# Patient Record
Sex: MALE | Race: WHITE | NOT HISPANIC OR LATINO
[De-identification: names, ages, dates, MRNs, and addresses within clinical notes are randomized per-mention and may not be internally consistent; named-entity substitution may affect disease eponyms.]

---

## 2017-01-03 ENCOUNTER — RESULT REVIEW (OUTPATIENT)
Age: 72
End: 2017-01-03

## 2017-02-27 ENCOUNTER — OUTPATIENT (OUTPATIENT)
Dept: OUTPATIENT SERVICES | Facility: HOSPITAL | Age: 72
LOS: 1 days | Discharge: HOME | End: 2017-02-27
Payer: MEDICARE

## 2017-02-27 ENCOUNTER — MED ADMIN CHARGE (OUTPATIENT)
Age: 72
End: 2017-02-27

## 2017-02-27 VITALS
TEMPERATURE: 98 F | HEART RATE: 78 BPM | DIASTOLIC BLOOD PRESSURE: 60 MMHG | OXYGEN SATURATION: 95 % | SYSTOLIC BLOOD PRESSURE: 110 MMHG | RESPIRATION RATE: 14 BRPM

## 2017-02-27 DIAGNOSIS — E78.5 HYPERLIPIDEMIA, UNSPECIFIED: ICD-10-CM

## 2017-02-27 DIAGNOSIS — Z95.1 PRESENCE OF AORTOCORONARY BYPASS GRAFT: ICD-10-CM

## 2017-02-27 DIAGNOSIS — I25.110 ATHEROSCLEROTIC HEART DISEASE OF NATIVE CORONARY ARTERY WITH UNSTABLE ANGINA PECTORIS: ICD-10-CM

## 2017-02-27 DIAGNOSIS — I50.9 HEART FAILURE, UNSPECIFIED: ICD-10-CM

## 2017-02-27 DIAGNOSIS — I25.2 OLD MYOCARDIAL INFARCTION: ICD-10-CM

## 2017-02-27 DIAGNOSIS — Z98.890 OTHER SPECIFIED POSTPROCEDURAL STATES: Chronic | ICD-10-CM

## 2017-02-27 DIAGNOSIS — R94.39 ABNORMAL RESULT OF OTHER CARDIOVASCULAR FUNCTION STUDY: ICD-10-CM

## 2017-02-27 DIAGNOSIS — I10 ESSENTIAL (PRIMARY) HYPERTENSION: ICD-10-CM

## 2017-02-27 DIAGNOSIS — Z95.810 PRESENCE OF AUTOMATIC (IMPLANTABLE) CARDIAC DEFIBRILLATOR: Chronic | ICD-10-CM

## 2017-02-27 LAB
APTT BLD: 39.9 SEC — HIGH (ref 27.5–37.4)
INR BLD: 2.03 — HIGH (ref 0.88–1.16)
PROTHROM AB SERPL-ACNC: 22.7 SEC — HIGH (ref 10–13.1)

## 2017-02-27 PROCEDURE — 33262 RMVL& REPLC PULSE GEN 1 LEAD: CPT

## 2017-02-27 PROCEDURE — 93641 EP EVL 1/2CHMB PAC CVDFB TST: CPT | Mod: 26

## 2017-02-27 PROCEDURE — 36415 COLL VENOUS BLD VENIPUNCTURE: CPT

## 2017-02-27 PROCEDURE — 93458 L HRT ARTERY/VENTRICLE ANGIO: CPT | Mod: XU

## 2017-02-27 PROCEDURE — 92928 PRQ TCAT PLMT NTRAC ST 1 LES: CPT | Mod: LC

## 2017-02-27 PROCEDURE — 85730 THROMBOPLASTIN TIME PARTIAL: CPT

## 2017-02-27 PROCEDURE — 85610 PROTHROMBIN TIME: CPT

## 2017-02-27 PROCEDURE — C1722: CPT

## 2017-02-27 RX ORDER — CEFAZOLIN SODIUM 1 G
1000 VIAL (EA) INJECTION ONCE
Qty: 0 | Refills: 0 | Status: DISCONTINUED | OUTPATIENT
Start: 2017-02-27 | End: 2017-02-27

## 2017-02-27 NOTE — H&P ADULT. - HISTORY OF PRESENT ILLNESS
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 He has been maintained on Sotalol for arrhythmia suppression. He had ATP for VT and one appropriate ICD shock in 2010. Echo and stress test were completed. His last ICD therapy noted was in 2012.  He feels well at baseline. Denies palpitations, CP, SOB, dizziness, or syncope. His ICD has reached CALEB and he is here for ICD generator change.  He is on Coumadin for h/o pAF.  Last dose of coumadin was 2 days ago. (Dr. Pichardo follows his INR).

## 2017-02-27 NOTE — PROGRESS NOTE ADULT - SUBJECTIVE AND OBJECTIVE BOX
JENNIFER ANDREWS  [id number]    Procedure:  CRYO PVI for AFib  Ablation of CTI for AFlutter        Indication:  Persistent atrial fibrillation        Electrophysiologist(s):  MD Audie John MD Mountantonakis, MD        Anesthesia:  MD Abdoul Bullard MD      Findings:  Successful ablation of pulmonary veins  Successful ablation of CTI with demonstration of bidirectional block       Complications:  Post procedure, blood was noted in the mouth, pre-extubation that was due to a laceration of the tongue.  At no time was the patient's blood pressure, oxygenation or respiratory status comprimised.  ENT is called and will see the patient while he is still intubated.        Recommendations:  ENT recommendation pending.  Anticoagulation to resume this evening.  Resume other medications.

## 2017-02-27 NOTE — H&P ADULT. - ASSESSMENT
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 He has been maintained on Sotalol for arrhythmia suppression. He had ATP for VT and one appropriate ICD shock in 2010. Echo and stress test were completed. His last ICD therapy noted was in 2012.  He feels well at baseline. Denies palpitations, CP, SOB, dizziness, or syncope. His ICD has reached CALEB and he is here for ICD generator change.  He is on Coumadin for h/o pAF.  Last dose of coumadin was 2 days ago. (Dr. Pichardo follows his INR).   - NPO for ICD gen change  - INR check today.   - To resume home meds.   - d/c home today  - f/u with Dr. John in 1 month

## 2017-02-27 NOTE — H&P ADULT. - PMH
DM (diabetes mellitus)    Dyslipidemia    HTN (hypertension)    MI, old    Paroxysmal atrial fibrillation    VT (ventricular tachycardia)

## 2017-04-11 ENCOUNTER — APPOINTMENT (OUTPATIENT)
Dept: HEART AND VASCULAR | Facility: CLINIC | Age: 72
End: 2017-04-11

## 2017-04-11 VITALS
BODY MASS INDEX: 30.1 KG/M2 | HEART RATE: 61 BPM | SYSTOLIC BLOOD PRESSURE: 113 MMHG | HEIGHT: 71 IN | WEIGHT: 215 LBS | DIASTOLIC BLOOD PRESSURE: 65 MMHG

## 2017-10-17 ENCOUNTER — APPOINTMENT (OUTPATIENT)
Dept: HEART AND VASCULAR | Facility: CLINIC | Age: 72
End: 2017-10-17
Payer: MEDICARE

## 2017-10-17 VITALS
DIASTOLIC BLOOD PRESSURE: 65 MMHG | HEIGHT: 71 IN | SYSTOLIC BLOOD PRESSURE: 117 MMHG | BODY MASS INDEX: 30.1 KG/M2 | HEART RATE: 62 BPM | WEIGHT: 215 LBS

## 2017-10-17 PROCEDURE — 99215 OFFICE O/P EST HI 40 MIN: CPT | Mod: 25

## 2017-10-17 PROCEDURE — 93282 PRGRMG EVAL IMPLANTABLE DFB: CPT

## 2017-10-17 RX ORDER — CEPHALEXIN 500 MG/1
500 CAPSULE ORAL
Qty: 10 | Refills: 0 | Status: DISCONTINUED | COMMUNITY
Start: 2017-02-27 | End: 2017-10-17

## 2018-02-06 ENCOUNTER — APPOINTMENT (OUTPATIENT)
Dept: HEART AND VASCULAR | Facility: CLINIC | Age: 73
End: 2018-02-06
Payer: MEDICARE

## 2018-02-06 VITALS
WEIGHT: 212 LBS | DIASTOLIC BLOOD PRESSURE: 63 MMHG | SYSTOLIC BLOOD PRESSURE: 106 MMHG | HEART RATE: 57 BPM | BODY MASS INDEX: 29.68 KG/M2 | HEIGHT: 71 IN

## 2018-02-06 PROCEDURE — 93282 PRGRMG EVAL IMPLANTABLE DFB: CPT

## 2018-08-07 ENCOUNTER — APPOINTMENT (OUTPATIENT)
Dept: HEART AND VASCULAR | Facility: CLINIC | Age: 73
End: 2018-08-07
Payer: MEDICARE

## 2018-08-07 VITALS
SYSTOLIC BLOOD PRESSURE: 107 MMHG | HEART RATE: 60 BPM | BODY MASS INDEX: 29.12 KG/M2 | DIASTOLIC BLOOD PRESSURE: 61 MMHG | HEIGHT: 71 IN | WEIGHT: 208 LBS

## 2018-08-07 PROBLEM — I47.2 VENTRICULAR TACHYCARDIA: Chronic | Status: ACTIVE | Noted: 2017-02-27

## 2018-08-07 PROBLEM — E78.5 HYPERLIPIDEMIA, UNSPECIFIED: Chronic | Status: ACTIVE | Noted: 2017-02-27

## 2018-08-07 PROBLEM — I10 ESSENTIAL (PRIMARY) HYPERTENSION: Chronic | Status: ACTIVE | Noted: 2017-02-27

## 2018-08-07 PROBLEM — E11.9 TYPE 2 DIABETES MELLITUS WITHOUT COMPLICATIONS: Chronic | Status: ACTIVE | Noted: 2017-02-27

## 2018-08-07 PROBLEM — I25.2 OLD MYOCARDIAL INFARCTION: Chronic | Status: ACTIVE | Noted: 2017-02-27

## 2018-08-07 PROBLEM — I48.0 PAROXYSMAL ATRIAL FIBRILLATION: Chronic | Status: ACTIVE | Noted: 2017-02-27

## 2018-08-07 PROCEDURE — 93282 PRGRMG EVAL IMPLANTABLE DFB: CPT | Mod: 59

## 2018-08-15 ENCOUNTER — APPOINTMENT (OUTPATIENT)
Dept: ENDOCRINOLOGY | Facility: CLINIC | Age: 73
End: 2018-08-15
Payer: MEDICARE

## 2018-08-15 VITALS
DIASTOLIC BLOOD PRESSURE: 70 MMHG | SYSTOLIC BLOOD PRESSURE: 106 MMHG | WEIGHT: 205 LBS | HEIGHT: 71 IN | HEART RATE: 61 BPM | BODY MASS INDEX: 28.7 KG/M2

## 2018-08-15 DIAGNOSIS — E11.29 TYPE 2 DIABETES MELLITUS WITH OTHER DIABETIC KIDNEY COMPLICATION: ICD-10-CM

## 2018-08-15 LAB
GLUCOSE BLDC GLUCOMTR-MCNC: 166
HBA1C MFR BLD HPLC: 7.6

## 2018-08-15 PROCEDURE — 82962 GLUCOSE BLOOD TEST: CPT

## 2018-08-15 PROCEDURE — 97802 MEDICAL NUTRITION INDIV IN: CPT

## 2018-08-15 PROCEDURE — 99204 OFFICE O/P NEW MOD 45 MIN: CPT | Mod: 25

## 2018-08-15 PROCEDURE — 83036 HEMOGLOBIN GLYCOSYLATED A1C: CPT | Mod: QW

## 2018-11-21 ENCOUNTER — APPOINTMENT (OUTPATIENT)
Dept: ENDOCRINOLOGY | Facility: CLINIC | Age: 73
End: 2018-11-21

## 2018-12-04 ENCOUNTER — APPOINTMENT (OUTPATIENT)
Dept: HEART AND VASCULAR | Facility: CLINIC | Age: 73
End: 2018-12-04
Payer: MEDICARE

## 2018-12-04 VITALS
BODY MASS INDEX: 27.58 KG/M2 | HEIGHT: 71 IN | DIASTOLIC BLOOD PRESSURE: 70 MMHG | HEART RATE: 65 BPM | WEIGHT: 197 LBS | SYSTOLIC BLOOD PRESSURE: 116 MMHG

## 2018-12-04 PROCEDURE — 99215 OFFICE O/P EST HI 40 MIN: CPT | Mod: 25

## 2018-12-04 PROCEDURE — 93282 PRGRMG EVAL IMPLANTABLE DFB: CPT

## 2018-12-05 NOTE — END OF VISIT
[] : Nurse Practitioner [>50% of Time Spent on Counseling and Coordination of Care for  ___] : Greater than 50% of the encounter time was spent on counseling and coordination of care for [unfilled] [Time Spent: ___ minutes] : I have spent [unfilled] minutes of face to face time with the patient

## 2018-12-05 NOTE — PHYSICAL EXAM
[General Appearance - Well Developed] : well developed [Normal Appearance] : normal appearance [Well Groomed] : well groomed [General Appearance - Well Nourished] : well nourished [No Deformities] : no deformities [General Appearance - In No Acute Distress] : no acute distress [Heart Rate And Rhythm] : heart rate and rhythm were normal [Heart Sounds] : normal S1 and S2 [Edema] : no peripheral edema present [] : no respiratory distress [Respiration, Rhythm And Depth] : normal respiratory rhythm and effort [Exaggerated Use Of Accessory Muscles For Inspiration] : no accessory muscle use [Auscultation Breath Sounds / Voice Sounds] : lungs were clear to auscultation bilaterally [Left Infraclavicular] : left infraclavicular area [Clean] : clean [Dry] : dry [Well-Healed] : well-healed [Palpable Crepitus] : no palpable crepitus [Bleeding] : no active bleeding [Foul Odor] : no foul smell [Purulent Drainage] : no purulent drainage [Serosanguineous Drainage] : no serosanquineous drainage [Serous Drainage] : no serous drainage [Erythema] : not erythematous [Warm] : not warm [Tender] : not tender [Indurated] : not indurated [Fluctuant] : not fluctuant [Abdomen Mass (___ Cm)] : no abdominal mass palpated [Cyanosis, Localized] : no localized cyanosis

## 2018-12-05 NOTE — HISTORY OF PRESENT ILLNESS
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  A single chamber ICD was implanted in 2000 with generator change in 2/2008 and 2/2017.  He has been maintained on Sotalol for arrhythmia suppression.  \par \par Throughout the years he has had VT terminated by ATP and had an appropriate ICD shock in 2010, with successful arrhythmia termination.  Echo and stress test were completed.  Sotalol was increased from 80 to 120mg TID.  \par \par Presents with c/o ICD shock 11/25/18.  Admits to some dietary noncompliance.  Also concerned because he has been receiving Sotalol from a different .  Tolerating Coumadin without bleeding issues.

## 2018-12-05 NOTE — PROCEDURE
[No] : not [ICD] : Implantable cardioverter-defibrillator [VVI] : VVI [Charge Time: ___ sec] : charge time was [unfilled] seconds [Longevity: ___ months] : The estimated remaining battery life is [unfilled] months [Normal] : The battery status is normal. [Threshold Testing Performed] : Threshold testing was performed [Lead Imp:  ___ohms] : lead impedance was [unfilled] ohms [Sensing Amplitude ___mv] : sensing amplitude was [unfilled] mv [___V @] : [unfilled] V [___ ms] : [unfilled] ms [None] : none [Sense ___ %] : Sense [unfilled]% [de-identified] : Medtronic [de-identified] : Etienne NOONAN VR [de-identified] : [id number] [de-identified] : 27 February 2017 [de-identified] : 42 [de-identified] : 9.8  years [de-identified] : VT event 11/25/18, CL ~ 300 ms  -  S/P ATP and 35 J shock with return to SR\par OptiVol threshold crossed mid-late November\par VS 99%\par  <0.1%\par shock impedance 52

## 2019-02-08 ENCOUNTER — APPOINTMENT (OUTPATIENT)
Dept: HEART AND VASCULAR | Facility: CLINIC | Age: 74
End: 2019-02-08
Payer: MEDICARE

## 2019-02-08 VITALS
WEIGHT: 198 LBS | DIASTOLIC BLOOD PRESSURE: 64 MMHG | HEART RATE: 63 BPM | BODY MASS INDEX: 27.72 KG/M2 | HEIGHT: 71 IN | SYSTOLIC BLOOD PRESSURE: 100 MMHG

## 2019-02-08 DIAGNOSIS — Z86.79 PERSONAL HISTORY OF OTHER DISEASES OF THE CIRCULATORY SYSTEM: ICD-10-CM

## 2019-02-08 PROCEDURE — 99215 OFFICE O/P EST HI 40 MIN: CPT | Mod: 25

## 2019-02-08 PROCEDURE — 93283 PRGRMG EVAL IMPLANTABLE DFB: CPT

## 2019-02-08 PROCEDURE — 93290 INTERROG DEV EVAL ICPMS IP: CPT | Mod: 26

## 2019-02-08 NOTE — PHYSICAL EXAM
[General Appearance - Well Developed] : well developed [Normal Appearance] : normal appearance [Well Groomed] : well groomed [General Appearance - Well Nourished] : well nourished [No Deformities] : no deformities [General Appearance - In No Acute Distress] : no acute distress [Heart Rate And Rhythm] : heart rate and rhythm were normal [Heart Sounds] : normal S1 and S2 [Edema] : no peripheral edema present [] : no respiratory distress [Respiration, Rhythm And Depth] : normal respiratory rhythm and effort [Exaggerated Use Of Accessory Muscles For Inspiration] : no accessory muscle use [Auscultation Breath Sounds / Voice Sounds] : lungs were clear to auscultation bilaterally [Left Infraclavicular] : left infraclavicular area [Clean] : clean [Dry] : dry [Well-Healed] : well-healed [Abdomen Mass (___ Cm)] : no abdominal mass palpated [Cyanosis, Localized] : no localized cyanosis [Palpable Crepitus] : no palpable crepitus [Bleeding] : no active bleeding [Foul Odor] : no foul smell [Purulent Drainage] : no purulent drainage [Serosanguineous Drainage] : no serosanquineous drainage [Serous Drainage] : no serous drainage [Erythema] : not erythematous [Warm] : not warm [Tender] : not tender [Indurated] : not indurated [Fluctuant] : not fluctuant

## 2019-02-08 NOTE — PROCEDURE
[No] : not [ICD] : Implantable cardioverter-defibrillator [VVI] : VVI [Charge Time: ___ sec] : charge time was [unfilled] seconds [Longevity: ___ months] : The estimated remaining battery life is [unfilled] months [Normal] : The battery status is normal. [Threshold Testing Performed] : Threshold testing was performed [Lead Imp:  ___ohms] : lead impedance was [unfilled] ohms [Sensing Amplitude ___mv] : sensing amplitude was [unfilled] mv [___V @] : [unfilled] V [___ ms] : [unfilled] ms [None] : none [Sense ___ %] : Sense [unfilled]% [NSR] : normal sinus rhythm [de-identified] : Medtronic [de-identified] : Etienne NOONAN VR [de-identified] : [id number] [de-identified] : 27 February 2017 [de-identified] : 42 [de-identified] : 9.8  years [de-identified] : VT event 1/24/19, CL ~ 300 ms  -  S/P ATP and 35 J shock with return to SR\par OptiVol threshold not crossed\par VS 99%\par  <0.1%\par shock impedance 48/66

## 2019-02-08 NOTE — HISTORY OF PRESENT ILLNESS
[None] : The patient complains of no symptoms [de-identified] : Mr. Layton is a 73 year old man with DM, AWMI, NSVT and inducible VT at EPS.  A single chamber ICD was implanted in 2000 with generator change in 2/2008 and 2/2017.  He has been maintained on Sotalol for arrhythmia suppression.  \par \par Throughout the years he has had VT terminated by ATP and had an appropriate ICD shock in 2010, with successful arrhythmia termination.  Echo and stress test were completed.  Sotalol was increased from 80 to 120mg TID.  \par \par Recently presented with c/o ICD shock 11/25/18.  Admits to some dietary noncompliance.  Also concerned because he has been receiving Sotalol from a different .  On 1/24 the patient received another shock corresponding with VT at 188 bpm. States that he was compliant with sotalol and feeling well. He notes that seconds prior to the shock he felt some dizziness, the shock occurred and he felt better. He presents today to discuss VT management. \par \par Tolerating Coumadin without bleeding issues.

## 2019-02-28 ENCOUNTER — FORM ENCOUNTER (OUTPATIENT)
Age: 74
End: 2019-02-28

## 2019-03-01 ENCOUNTER — APPOINTMENT (OUTPATIENT)
Dept: CT IMAGING | Facility: HOSPITAL | Age: 74
End: 2019-03-01
Payer: MEDICARE

## 2019-03-01 ENCOUNTER — OUTPATIENT (OUTPATIENT)
Dept: OUTPATIENT SERVICES | Facility: HOSPITAL | Age: 74
LOS: 1 days | End: 2019-03-01
Payer: MEDICARE

## 2019-03-01 DIAGNOSIS — Z98.890 OTHER SPECIFIED POSTPROCEDURAL STATES: Chronic | ICD-10-CM

## 2019-03-01 DIAGNOSIS — Z95.810 PRESENCE OF AUTOMATIC (IMPLANTABLE) CARDIAC DEFIBRILLATOR: Chronic | ICD-10-CM

## 2019-03-01 PROCEDURE — 75574 CT ANGIO HRT W/3D IMAGE: CPT | Mod: 26

## 2019-03-01 PROCEDURE — 75574 CT ANGIO HRT W/3D IMAGE: CPT

## 2019-03-01 PROCEDURE — C8929: CPT

## 2019-03-01 PROCEDURE — 93306 TTE W/DOPPLER COMPLETE: CPT | Mod: 26

## 2019-03-07 ENCOUNTER — FORM ENCOUNTER (OUTPATIENT)
Age: 74
End: 2019-03-07

## 2019-03-07 VITALS
DIASTOLIC BLOOD PRESSURE: 67 MMHG | SYSTOLIC BLOOD PRESSURE: 112 MMHG | OXYGEN SATURATION: 97 % | RESPIRATION RATE: 18 BRPM | HEART RATE: 54 BPM | TEMPERATURE: 97 F

## 2019-03-07 NOTE — H&P ADULT - ASSESSMENT
73 y o male with FMH of heart diseases with PMH of Afib (on Coumadin last dose on 3/5/19), s/p ICD placement in [facility] in 2017, HTN, HLD, DM, CAD (s/p stent placement in 1994 in [facility], pt was told to bring card) presented to his doctor Dora after ICD shocked him twice on November 25/18 and January 24/2019 and underwent an abnormal CTA and abnormal ECHO and is now referred to [facility] for recommended LHC with possible intervention.      Risks & benefits of procedure and alternative therapy have been explained to the patient including but not limited to: allergic reaction, bleeding w/possible need for blood transfusion, infection, renal and vascular compromise, limb damage, arrhythmia, stroke, vessel dissection/perforation, Myocardial infarction, emergent CABG. Informed consent obtained and in chart. 73 y o male with FMH of heart diseases with PMH of Afib (on Coumadin last dose on 3/5/19), s/p ICD placement in [facility] in 2017, HTN, HLD, DM, CAD (s/p stent placement in 1994 in [facility], pt was told to bring card) presented to his doctor Dora after ICD shocked him twice on November 25/18 and January 24/2019 and underwent an abnormal CTA and abnormal ECHO and is now referred to [facility] for recommended LHC with possible intervention.      INR 1.76, Dr. Padron (intervention fellow) made aware  Loaded with ASA 325mg and Plavix 600mg PO x1 prior to cath procedure    Risks & benefits of procedure and alternative therapy have been explained to the patient including but not limited to: allergic reaction, bleeding w/possible need for blood transfusion, infection, renal and vascular compromise, limb damage, arrhythmia, stroke, vessel dissection/perforation, Myocardial infarction, emergent CABG. Informed consent obtained and in chart.

## 2019-03-07 NOTE — H&P ADULT - HISTORY OF PRESENT ILLNESS
73 y o male with PMH of Afib (on Coumadin last dose on 3/1/19), s/p ICD placement in [facility] in 2017, HTN, HLD, DM, obesity, polymyalgia rheumatica, shingles, CAD (s/p multiple stents), s/p STEMI 4/27/17 (last cath in [facility] into LAD, midly reduced EF of 46% presented to his doctor c/o CP. Pt denies SOB, LE edema, PND/orthopnea, dizziness, palpitations, syncope, n/v, fever/chills, blood in the stool. CT angio of coronaries was done on 3/1/19 which indicated no ARGELIA, no LA thrombus, Calcium score of 3866 A units, severe stenosis of LAD/D1; dense calcified plaque in prox LCX/large ramus intermedius/prox RCA, severe stenosis cannot be excluded; motion artifact in OM1, distl RCA and RPL significant stenosis cannot be excluded; wall thinning with low attenuation in the basal to apical anterior walls, basal to mid anteroseptum, consistent with MI; LVEF severely depressed with akinesis of the basal to apical anterior walls and basal to mid anteroseptum, LV dilated, LA moderately dilated. ECHO done on 3/1/19 indicated severe dilated LV, severely reduced LV systolic function with EF of 25%, regional WMA, LV diastolic dysfucntion, gr II, no LV thrombi, LA mildly dilated, trace MR/TR/HI. In light of pt's risk factors, symptoms mentioned above, prior history of CAD, abnormal CTA and newly depressed EF, pt is now referred to [facility] for recommended LHC with possible intervention. PT WILL BRING MEDS WITH HIM PLEASE REVIEW    73 y o male with FMH of heart diseases with PMH of Afib (on Coumadin last dose on 3/5/19), s/p ICD placement in [facility] in 2017, HTN, HLD, DM, CAD (s/p stent placement in 1994 in [facility], pt was told to bring card) presented to his doctor Dora after ICD shocked him twice on November 25/18 and January 24/2019. Pt is on Sotalol for VT suppression. Pt was scheduled to go for VT ablation on Monday March 11, but prior to his ablation will undergo LHC due to abnormal CTA. Other than ICD being shocked twice pt denies CP, SOB, LE edema, PND/orthopnea, dizziness, palpitations, syncope, n/v, fever/chills, blood in the stool. CT angio of coronaries was done on 3/1/19 which indicated no ARGELIA, no LA thrombus, Calcium score of 3866 A units, severe stenosis of LAD/D1; dense calcified plaque in prox LCX/large ramus intermedius/prox RCA, severe stenosis cannot be excluded; motion artifact in OM1, distl RCA and RPL significant stenosis cannot be excluded; wall thinning with low attenuation in the basal to apical anterior walls, basal to mid anteroseptum, consistent with MI; LVEF severely depressed with akinesis of the basal to apical anterior walls and basal to mid anteroseptum, LV dilated, LA moderately dilated. ECHO done on 3/1/19 indicated severe dilated LV, severely reduced LV systolic function with EF of 25%, regional WMA, LV diastolic dysfucntion, gr II, no LV thrombi, LA mildly dilated, trace MR/TR/TN. In light of pt's risk factors, symptoms mentioned above, prior history of CAD, abnormal CTA and abnormal ECHO, pt is now referred to [facility] for recommended LHC with possible intervention. 73 y o male with FMH of heart diseases with PMH of Afib (on Coumadin last dose on 3/5/19), s/p ICD placement in [facility] in 2017, HTN, HLD, DM, CAD (s/p stent placement in 1994 in [facility], pt was told to bring card) presented to his doctor Dora after ICD shocked him twice on November 25/18 and January 24/2019. Pt is on Sotalol for VT suppression. Pt was scheduled to go for VT ablation on Monday March 11, but prior to his ablation will undergo LHC due to abnormal CTA. Other than ICD being shocked twice pt denies CP, SOB, LE edema, PND/orthopnea, dizziness, palpitations, syncope, n/v, fever/chills, blood in the stool. CT angio of coronaries was done on 3/1/19 which indicated no ARGELIA, no LA thrombus, Calcium score of 3866 A units, severe stenosis of LAD/D1; dense calcified plaque in prox LCX/large ramus intermedius/prox RCA, severe stenosis cannot be excluded; motion artifact in OM1, distl RCA and RPL significant stenosis cannot be excluded; wall thinning with low attenuation in the basal to apical anterior walls, basal to mid anteroseptum, consistent with MI; LVEF severely depressed with akinesis of the basal to apical anterior walls and basal to mid anteroseptum, LV dilated, LA moderately dilated. ECHO done on 3/1/19 indicated severe dilated LV, severely reduced LV systolic function with EF of 25%, regional WMA, LV diastolic dysfucntion, gr II, no LV thrombi, LA mildly dilated, trace MR/TR/MI. In light of pt's risk factors, symptoms mentioned above, prior history of CAD, abnormal CTA and abnormal ECHO, pt is now referred to [facility] for recommended LHC with possible intervention.

## 2019-03-08 ENCOUNTER — INPATIENT (INPATIENT)
Facility: HOSPITAL | Age: 74
LOS: 4 days | Discharge: HOME | DRG: 286 | End: 2019-03-13
Attending: PSYCHIATRY & NEUROLOGY | Admitting: PSYCHIATRY & NEUROLOGY
Payer: MEDICARE

## 2019-03-08 DIAGNOSIS — G97.82 OTHER POSTPROCEDURAL COMPLICATIONS AND DISORDERS OF NERVOUS SYSTEM: ICD-10-CM

## 2019-03-08 DIAGNOSIS — Z98.890 OTHER SPECIFIED POSTPROCEDURAL STATES: Chronic | ICD-10-CM

## 2019-03-08 DIAGNOSIS — Z95.810 PRESENCE OF AUTOMATIC (IMPLANTABLE) CARDIAC DEFIBRILLATOR: Chronic | ICD-10-CM

## 2019-03-08 LAB
ALBUMIN SERPL ELPH-MCNC: 4.1 G/DL — SIGNIFICANT CHANGE UP (ref 3.3–5)
ALBUMIN SERPL ELPH-MCNC: 4.2 G/DL — SIGNIFICANT CHANGE UP (ref 3.3–5)
ALP SERPL-CCNC: 57 U/L — SIGNIFICANT CHANGE UP (ref 40–120)
ALP SERPL-CCNC: 57 U/L — SIGNIFICANT CHANGE UP (ref 40–120)
ALT FLD-CCNC: 16 U/L — SIGNIFICANT CHANGE UP (ref 10–45)
ALT FLD-CCNC: 18 U/L — SIGNIFICANT CHANGE UP (ref 10–45)
ANION GAP SERPL CALC-SCNC: 10 MMOL/L — SIGNIFICANT CHANGE UP (ref 5–17)
ANION GAP SERPL CALC-SCNC: 10 MMOL/L — SIGNIFICANT CHANGE UP (ref 5–17)
APTT BLD: 31.4 SEC — SIGNIFICANT CHANGE UP (ref 27.5–36.3)
APTT BLD: >200 SEC — CRITICAL HIGH (ref 27.5–36.3)
AST SERPL-CCNC: 18 U/L — SIGNIFICANT CHANGE UP (ref 10–40)
AST SERPL-CCNC: 19 U/L — SIGNIFICANT CHANGE UP (ref 10–40)
BASOPHILS # BLD AUTO: 0.04 K/UL — SIGNIFICANT CHANGE UP (ref 0–0.2)
BASOPHILS NFR BLD AUTO: 0.6 % — SIGNIFICANT CHANGE UP (ref 0–2)
BILIRUB SERPL-MCNC: 0.7 MG/DL — SIGNIFICANT CHANGE UP (ref 0.2–1.2)
BILIRUB SERPL-MCNC: 1.1 MG/DL — SIGNIFICANT CHANGE UP (ref 0.2–1.2)
BUN SERPL-MCNC: 10 MG/DL — SIGNIFICANT CHANGE UP (ref 7–23)
BUN SERPL-MCNC: 13 MG/DL — SIGNIFICANT CHANGE UP (ref 7–23)
CALCIUM SERPL-MCNC: 8.8 MG/DL — SIGNIFICANT CHANGE UP (ref 8.4–10.5)
CALCIUM SERPL-MCNC: 9.2 MG/DL — SIGNIFICANT CHANGE UP (ref 8.4–10.5)
CHLORIDE SERPL-SCNC: 102 MMOL/L — SIGNIFICANT CHANGE UP (ref 96–108)
CHLORIDE SERPL-SCNC: 103 MMOL/L — SIGNIFICANT CHANGE UP (ref 96–108)
CHOLEST SERPL-MCNC: 96 MG/DL — SIGNIFICANT CHANGE UP (ref 10–199)
CO2 SERPL-SCNC: 25 MMOL/L — SIGNIFICANT CHANGE UP (ref 22–31)
CO2 SERPL-SCNC: 25 MMOL/L — SIGNIFICANT CHANGE UP (ref 22–31)
CREAT SERPL-MCNC: 0.66 MG/DL — SIGNIFICANT CHANGE UP (ref 0.5–1.3)
CREAT SERPL-MCNC: 0.69 MG/DL — SIGNIFICANT CHANGE UP (ref 0.5–1.3)
EOSINOPHIL # BLD AUTO: 0.2 K/UL — SIGNIFICANT CHANGE UP (ref 0–0.5)
EOSINOPHIL NFR BLD AUTO: 3 % — SIGNIFICANT CHANGE UP (ref 0–6)
GLUCOSE BLDC GLUCOMTR-MCNC: 112 MG/DL — HIGH (ref 70–99)
GLUCOSE BLDC GLUCOMTR-MCNC: 145 MG/DL — HIGH (ref 70–99)
GLUCOSE SERPL-MCNC: 116 MG/DL — HIGH (ref 70–99)
GLUCOSE SERPL-MCNC: 160 MG/DL — HIGH (ref 70–99)
HBA1C BLD-MCNC: 6.8 % — HIGH (ref 4–5.6)
HCT VFR BLD CALC: 43.2 % — SIGNIFICANT CHANGE UP (ref 39–50)
HCT VFR BLD CALC: 44.4 % — SIGNIFICANT CHANGE UP (ref 39–50)
HDLC SERPL-MCNC: 34 MG/DL — LOW
HGB BLD-MCNC: 14.6 G/DL — SIGNIFICANT CHANGE UP (ref 13–17)
HGB BLD-MCNC: 14.7 G/DL — SIGNIFICANT CHANGE UP (ref 13–17)
IMM GRANULOCYTES NFR BLD AUTO: 0.1 % — SIGNIFICANT CHANGE UP (ref 0–1.5)
INR BLD: 1.76 — HIGH (ref 0.88–1.16)
INR BLD: 2.02 — HIGH (ref 0.88–1.16)
LIPID PNL WITH DIRECT LDL SERPL: 44 MG/DL — SIGNIFICANT CHANGE UP
LYMPHOCYTES # BLD AUTO: 2.19 K/UL — SIGNIFICANT CHANGE UP (ref 1–3.3)
LYMPHOCYTES # BLD AUTO: 32.7 % — SIGNIFICANT CHANGE UP (ref 13–44)
MCHC RBC-ENTMCNC: 32.5 PG — SIGNIFICANT CHANGE UP (ref 27–34)
MCHC RBC-ENTMCNC: 33 PG — SIGNIFICANT CHANGE UP (ref 27–34)
MCHC RBC-ENTMCNC: 33.1 GM/DL — SIGNIFICANT CHANGE UP (ref 32–36)
MCHC RBC-ENTMCNC: 33.8 GM/DL — SIGNIFICANT CHANGE UP (ref 32–36)
MCV RBC AUTO: 97.5 FL — SIGNIFICANT CHANGE UP (ref 80–100)
MCV RBC AUTO: 98 FL — SIGNIFICANT CHANGE UP (ref 80–100)
MONOCYTES # BLD AUTO: 0.61 K/UL — SIGNIFICANT CHANGE UP (ref 0–0.9)
MONOCYTES NFR BLD AUTO: 9.1 % — SIGNIFICANT CHANGE UP (ref 2–14)
NEUTROPHILS # BLD AUTO: 3.65 K/UL — SIGNIFICANT CHANGE UP (ref 1.8–7.4)
NEUTROPHILS NFR BLD AUTO: 54.5 % — SIGNIFICANT CHANGE UP (ref 43–77)
NRBC # BLD: 0 /100 WBCS — SIGNIFICANT CHANGE UP (ref 0–0)
NRBC # BLD: 0 /100 WBCS — SIGNIFICANT CHANGE UP (ref 0–0)
PLATELET # BLD AUTO: 125 K/UL — LOW (ref 150–400)
PLATELET # BLD AUTO: 133 K/UL — LOW (ref 150–400)
POTASSIUM SERPL-MCNC: 4.2 MMOL/L — SIGNIFICANT CHANGE UP (ref 3.5–5.3)
POTASSIUM SERPL-MCNC: 4.3 MMOL/L — SIGNIFICANT CHANGE UP (ref 3.5–5.3)
POTASSIUM SERPL-SCNC: 4.2 MMOL/L — SIGNIFICANT CHANGE UP (ref 3.5–5.3)
POTASSIUM SERPL-SCNC: 4.3 MMOL/L — SIGNIFICANT CHANGE UP (ref 3.5–5.3)
PROT SERPL-MCNC: 7.1 G/DL — SIGNIFICANT CHANGE UP (ref 6–8.3)
PROT SERPL-MCNC: 7.1 G/DL — SIGNIFICANT CHANGE UP (ref 6–8.3)
PROTHROM AB SERPL-ACNC: 20.2 SEC — HIGH (ref 10–12.9)
PROTHROM AB SERPL-ACNC: 23.3 SEC — HIGH (ref 10–12.9)
RBC # BLD: 4.43 M/UL — SIGNIFICANT CHANGE UP (ref 4.2–5.8)
RBC # BLD: 4.53 M/UL — SIGNIFICANT CHANGE UP (ref 4.2–5.8)
RBC # FLD: 12.6 % — SIGNIFICANT CHANGE UP (ref 10.3–14.5)
RBC # FLD: 12.8 % — SIGNIFICANT CHANGE UP (ref 10.3–14.5)
SODIUM SERPL-SCNC: 137 MMOL/L — SIGNIFICANT CHANGE UP (ref 135–145)
SODIUM SERPL-SCNC: 138 MMOL/L — SIGNIFICANT CHANGE UP (ref 135–145)
TOTAL CHOLESTEROL/HDL RATIO MEASUREMENT: 2.8 RATIO — LOW (ref 3.4–9.6)
TRIGL SERPL-MCNC: 92 MG/DL — SIGNIFICANT CHANGE UP (ref 10–149)
WBC # BLD: 6.68 K/UL — SIGNIFICANT CHANGE UP (ref 3.8–10.5)
WBC # BLD: 6.7 K/UL — SIGNIFICANT CHANGE UP (ref 3.8–10.5)
WBC # FLD AUTO: 6.68 K/UL — SIGNIFICANT CHANGE UP (ref 3.8–10.5)
WBC # FLD AUTO: 6.7 K/UL — SIGNIFICANT CHANGE UP (ref 3.8–10.5)

## 2019-03-08 PROCEDURE — 70496 CT ANGIOGRAPHY HEAD: CPT | Mod: 26

## 2019-03-08 PROCEDURE — 93571 IV DOP VEL&/PRESS C FLO 1ST: CPT | Mod: 26

## 2019-03-08 PROCEDURE — 70450 CT HEAD/BRAIN W/O DYE: CPT | Mod: 26,59

## 2019-03-08 PROCEDURE — 93010 ELECTROCARDIOGRAM REPORT: CPT

## 2019-03-08 PROCEDURE — 70498 CT ANGIOGRAPHY NECK: CPT | Mod: 26

## 2019-03-08 PROCEDURE — 99223 1ST HOSP IP/OBS HIGH 75: CPT

## 2019-03-08 PROCEDURE — 93454 CORONARY ARTERY ANGIO S&I: CPT | Mod: 26

## 2019-03-08 RX ORDER — SODIUM CHLORIDE 9 MG/ML
250 INJECTION INTRAMUSCULAR; INTRAVENOUS; SUBCUTANEOUS ONCE
Qty: 0 | Refills: 0 | Status: COMPLETED | OUTPATIENT
Start: 2019-03-08 | End: 2019-03-08

## 2019-03-08 RX ORDER — DEXTROSE 50 % IN WATER 50 %
12.5 SYRINGE (ML) INTRAVENOUS ONCE
Qty: 0 | Refills: 0 | Status: DISCONTINUED | OUTPATIENT
Start: 2019-03-08 | End: 2019-03-13

## 2019-03-08 RX ORDER — EMTRICITABINE AND TENOFOVIR DISOPROXIL FUMARATE 200; 300 MG/1; MG/1
1 TABLET, FILM COATED ORAL DAILY
Qty: 0 | Refills: 0 | Status: DISCONTINUED | OUTPATIENT
Start: 2019-03-08 | End: 2019-03-11

## 2019-03-08 RX ORDER — SOTALOL HCL 120 MG
120 TABLET ORAL EVERY 12 HOURS
Qty: 0 | Refills: 0 | Status: DISCONTINUED | OUTPATIENT
Start: 2019-03-08 | End: 2019-03-09

## 2019-03-08 RX ORDER — NALOXONE HYDROCHLORIDE 4 MG/.1ML
0.8 SPRAY NASAL ONCE
Qty: 0 | Refills: 0 | Status: COMPLETED | OUTPATIENT
Start: 2019-03-08 | End: 2019-03-08

## 2019-03-08 RX ORDER — ATORVASTATIN CALCIUM 80 MG/1
40 TABLET, FILM COATED ORAL AT BEDTIME
Qty: 0 | Refills: 0 | Status: DISCONTINUED | OUTPATIENT
Start: 2019-03-08 | End: 2019-03-13

## 2019-03-08 RX ORDER — DEXTROSE 50 % IN WATER 50 %
15 SYRINGE (ML) INTRAVENOUS ONCE
Qty: 0 | Refills: 0 | Status: DISCONTINUED | OUTPATIENT
Start: 2019-03-08 | End: 2019-03-13

## 2019-03-08 RX ORDER — CHLORHEXIDINE GLUCONATE 213 G/1000ML
1 SOLUTION TOPICAL ONCE
Qty: 0 | Refills: 0 | Status: DISCONTINUED | OUTPATIENT
Start: 2019-03-08 | End: 2019-03-08

## 2019-03-08 RX ORDER — SODIUM CHLORIDE 9 MG/ML
1000 INJECTION, SOLUTION INTRAVENOUS
Qty: 0 | Refills: 0 | Status: DISCONTINUED | OUTPATIENT
Start: 2019-03-08 | End: 2019-03-13

## 2019-03-08 RX ORDER — REPAGLINIDE 1 MG/1
1 TABLET ORAL
Qty: 0 | Refills: 0 | COMMUNITY

## 2019-03-08 RX ORDER — EZETIMIBE 10 MG/1
1 TABLET ORAL
Qty: 0 | Refills: 0 | COMMUNITY

## 2019-03-08 RX ORDER — NOREPINEPHRINE BITARTRATE/D5W 8 MG/250ML
0.05 PLASTIC BAG, INJECTION (ML) INTRAVENOUS
Qty: 8 | Refills: 0 | Status: DISCONTINUED | OUTPATIENT
Start: 2019-03-08 | End: 2019-03-09

## 2019-03-08 RX ORDER — EMTRICITABINE AND TENOFOVIR DISOPROXIL FUMARATE 200; 300 MG/1; MG/1
1 TABLET, FILM COATED ORAL
Qty: 0 | Refills: 0 | COMMUNITY

## 2019-03-08 RX ORDER — NOREPINEPHRINE BITARTRATE/D5W 8 MG/250ML
0.05 PLASTIC BAG, INJECTION (ML) INTRAVENOUS
Qty: 8 | Refills: 0 | Status: DISCONTINUED | OUTPATIENT
Start: 2019-03-08 | End: 2019-03-08

## 2019-03-08 RX ORDER — DEXTROSE 50 % IN WATER 50 %
25 SYRINGE (ML) INTRAVENOUS ONCE
Qty: 0 | Refills: 0 | Status: DISCONTINUED | OUTPATIENT
Start: 2019-03-08 | End: 2019-03-13

## 2019-03-08 RX ORDER — PHENYLEPHRINE HYDROCHLORIDE 10 MG/ML
200 INJECTION INTRAVENOUS ONCE
Qty: 0 | Refills: 0 | Status: DISCONTINUED | OUTPATIENT
Start: 2019-03-08 | End: 2019-03-08

## 2019-03-08 RX ORDER — PHENYLEPHRINE HYDROCHLORIDE 10 MG/ML
0.1 INJECTION INTRAVENOUS
Qty: 40 | Refills: 0 | Status: DISCONTINUED | OUTPATIENT
Start: 2019-03-08 | End: 2019-03-08

## 2019-03-08 RX ORDER — CLOPIDOGREL BISULFATE 75 MG/1
600 TABLET, FILM COATED ORAL ONCE
Qty: 0 | Refills: 0 | Status: COMPLETED | OUTPATIENT
Start: 2019-03-08 | End: 2019-03-08

## 2019-03-08 RX ORDER — INSULIN LISPRO 100/ML
VIAL (ML) SUBCUTANEOUS
Qty: 0 | Refills: 0 | Status: DISCONTINUED | OUTPATIENT
Start: 2019-03-08 | End: 2019-03-13

## 2019-03-08 RX ORDER — ASPIRIN/CALCIUM CARB/MAGNESIUM 324 MG
81 TABLET ORAL DAILY
Qty: 0 | Refills: 0 | Status: DISCONTINUED | OUTPATIENT
Start: 2019-03-08 | End: 2019-03-13

## 2019-03-08 RX ORDER — GLUCAGON INJECTION, SOLUTION 0.5 MG/.1ML
1 INJECTION, SOLUTION SUBCUTANEOUS ONCE
Qty: 0 | Refills: 0 | Status: DISCONTINUED | OUTPATIENT
Start: 2019-03-08 | End: 2019-03-13

## 2019-03-08 RX ORDER — SODIUM CHLORIDE 9 MG/ML
500 INJECTION INTRAMUSCULAR; INTRAVENOUS; SUBCUTANEOUS
Qty: 0 | Refills: 0 | Status: DISCONTINUED | OUTPATIENT
Start: 2019-03-08 | End: 2019-03-08

## 2019-03-08 RX ORDER — METFORMIN HYDROCHLORIDE 850 MG/1
1 TABLET ORAL
Qty: 0 | Refills: 0 | COMMUNITY

## 2019-03-08 RX ORDER — FENOFIBRATE,MICRONIZED 130 MG
1 CAPSULE ORAL
Qty: 0 | Refills: 0 | COMMUNITY

## 2019-03-08 RX ORDER — SOTALOL HCL 120 MG
1 TABLET ORAL
Qty: 0 | Refills: 0 | COMMUNITY

## 2019-03-08 RX ORDER — ASPIRIN/CALCIUM CARB/MAGNESIUM 324 MG
325 TABLET ORAL ONCE
Qty: 0 | Refills: 0 | Status: COMPLETED | OUTPATIENT
Start: 2019-03-08 | End: 2019-03-08

## 2019-03-08 RX ORDER — SACUBITRIL AND VALSARTAN 24; 26 MG/1; MG/1
1 TABLET, FILM COATED ORAL
Qty: 0 | Refills: 0 | COMMUNITY

## 2019-03-08 RX ADMIN — Medication 8.34 MICROGRAM(S)/KG/MIN: at 19:22

## 2019-03-08 RX ADMIN — SODIUM CHLORIDE 50 MILLILITER(S): 9 INJECTION INTRAMUSCULAR; INTRAVENOUS; SUBCUTANEOUS at 12:55

## 2019-03-08 RX ADMIN — NALOXONE HYDROCHLORIDE 0.8 MILLIGRAM(S): 4 SPRAY NASAL at 16:56

## 2019-03-08 RX ADMIN — Medication 325 MILLIGRAM(S): at 12:44

## 2019-03-08 RX ADMIN — PHENYLEPHRINE HYDROCHLORIDE 3.34 MICROGRAM(S)/KG/MIN: 10 INJECTION INTRAVENOUS at 17:29

## 2019-03-08 RX ADMIN — SODIUM CHLORIDE 500 MILLILITER(S): 9 INJECTION INTRAMUSCULAR; INTRAVENOUS; SUBCUTANEOUS at 16:58

## 2019-03-08 RX ADMIN — CLOPIDOGREL BISULFATE 600 MILLIGRAM(S): 75 TABLET, FILM COATED ORAL at 12:43

## 2019-03-08 RX ADMIN — ATORVASTATIN CALCIUM 40 MILLIGRAM(S): 80 TABLET, FILM COATED ORAL at 22:00

## 2019-03-08 NOTE — PROGRESS NOTE ADULT - SUBJECTIVE AND OBJECTIVE BOX
***********************  MICU Acceptance Note  ***********************  Hospital Course:    SUBJECTIVE: Patient seen and examined at bedside.    OBJECTIVE:    VITAL SIGNS:  ICU Vital Signs Last 24 Hrs  T(C): 35.7 (08 Mar 2019 19:00), Max: 35.7 (08 Mar 2019 18:40)  T(F): 96.2 (08 Mar 2019 19:00), Max: 96.2 (08 Mar 2019 18:40)  HR: 54 (08 Mar 2019 19:00) (48 - 54)  BP: 132/67 (08 Mar 2019 19:00) (132/67 - 135/71)  BP(mean): 83 (08 Mar 2019 19:00) (83 - 90)  ABP: --  ABP(mean): --  RR: 13 (08 Mar 2019 19:00) (13 - 26)  SpO2: 98% (08 Mar 2019 19:00) (98% - 99%)        CAPILLARY BLOOD GLUCOSE  106 (08 Mar 2019 18:00)      POCT Blood Glucose.: 112 mg/dL (08 Mar 2019 18:43)      PHYSICAL EXAM:    General: WDWN ; NAD  HEENT: NC/AT; PERRL, anicteric sclera  Neck: supple, no JVD  Respiratory: CTA B/L; no W/R/R  Cardiovascular: +S1/S2; RRR; no M/R/G  Gastrointestinal: soft, NT/ND; +BS x4  Extremities: WWP; 2+ peripheral pulses B/L; no LE edema  Skin: normal color and turgor; no rash  Neurological:     MEDICATIONS:  MEDICATIONS  (STANDING):  aspirin  chewable 81 milliGRAM(s) Oral daily  atorvastatin 40 milliGRAM(s) Oral at bedtime  dextrose 5%. 1000 milliLiter(s) (50 mL/Hr) IV Continuous <Continuous>  dextrose 50% Injectable 12.5 Gram(s) IV Push Once  dextrose 50% Injectable 25 Gram(s) IV Push Once  dextrose 50% Injectable 25 Gram(s) IV Push Once  emtricitabine 200 mG/tenofovir 300 mG (TRUVADA) 1 Tablet(s) Oral daily  insulin lispro (HumaLOG) corrective regimen sliding scale   SubCutaneous Before meals and at bedtime  norepinephrine Infusion 0.05 MICROgram(s)/kG/Min (8.344 mL/Hr) IV Continuous <Continuous>    MEDICATIONS  (PRN):  dextrose 40% Gel 15 Gram(s) Oral Once PRN Blood Glucose LESS THAN 70 milliGRAM(s)/deciliter  glucagon  Injectable 1 milliGRAM(s) IntraMuscular Once PRN Glucose LESS THAN 70 milligrams/deciliter      ALLERGIES:  Allergies    No Known Allergies    Intolerances        LABS:                        14.7   6.68  )-----------( 133      ( 08 Mar 2019 17:49 )             44.4     03-08    138  |  103  |  10  ----------------------------<  116<H>  4.2   |  25  |  0.66    Ca    8.8      08 Mar 2019 17:49    TPro  7.1  /  Alb  4.1  /  TBili  1.1  /  DBili  x   /  AST  19  /  ALT  16  /  AlkPhos  57  03-08    LIVER FUNCTIONS - ( 08 Mar 2019 17:49 )  Alb: 4.1 g/dL / Pro: 7.1 g/dL / ALK PHOS: 57 U/L / ALT: 16 U/L / AST: 19 U/L / GGT: x           PT/INR - ( 08 Mar 2019 16:29 )   PT: 23.3 sec;   INR: 2.02          PTT - ( 08 Mar 2019 16:29 )  PTT:>200.0 sec    CARDIAC MARKERS ( 08 Mar 2019 10:17 )  x     / x     / 94 U/L / x     / 1.6 ng/mL        RADIOLOGY & ADDITIONAL TESTS: Reviewed. ***********************  MICU Acceptance Note  ***********************  Hospital Course:  73 M with AFib on Coumadin (last dose 3/5), HFrEF (EF 25%) s/p ICD placement, HTN, HLD, CAD s/p PCI and 3 stents 3 yrs ago was taken to an outpatient elective cath with  today (diagnostic) prior to his VT ablation next week with  for ICD firing x2 in the past few months for sustained VT. Today post cath (he was given 50 Fentanyl and Versed for sedation) he was confused, aphasic dysarthric with right sided hemiplegia, stroke code was called (last known normal being 3.30PM), CT head, CTA both negative, however per neurology stroke attending hypoperfusion in M2 and M3 area noted. At the time his SBP was 113-123, tPA not given because INR>1.5. Patient was then given Narcan due to pinpoint pupils and started on phenylephrine to keep MAP>90 due to hypoperfused stroke. Since then patient's right sided weakness has completely resolved, however still some expressive aphasia present.    SUBJECTIVE: Patient seen and examined at bedside. No complaints, denies fever nausea vomiting, chest pain, palpitations. Endorses improvement in symptoms.    OBJECTIVE:    VITAL SIGNS:  ICU Vital Signs Last 24 Hrs  T(C): 35.7 (08 Mar 2019 19:00), Max: 35.7 (08 Mar 2019 18:40)  T(F): 96.2 (08 Mar 2019 19:00), Max: 96.2 (08 Mar 2019 18:40)  HR: 54 (08 Mar 2019 19:00) (48 - 54)  BP: 132/67 (08 Mar 2019 19:00) (132/67 - 135/71)  BP(mean): 83 (08 Mar 2019 19:00) (83 - 90)  ABP: --  ABP(mean): --  RR: 13 (08 Mar 2019 19:00) (13 - 26)  SpO2: 98% (08 Mar 2019 19:00) (98% - 99%)        CAPILLARY BLOOD GLUCOSE  106 (08 Mar 2019 18:00)      POCT Blood Glucose.: 112 mg/dL (08 Mar 2019 18:43)      PHYSICAL EXAM:    General: WDWN ; NAD  HEENT: NC/AT; PERRL, anicteric sclera  Neck: supple, no JVD  Respiratory: CTA B/L; no W/R/R  Cardiovascular: +S1/S2; RRR; no M/R/G  Gastrointestinal: soft, NT/ND; +BS x4  Extremities: WWP; 2+ peripheral pulses B/L; no LE edema  Skin: normal color and turgor; no rash  Neurological:     MEDICATIONS:  MEDICATIONS  (STANDING):  aspirin  chewable 81 milliGRAM(s) Oral daily  atorvastatin 40 milliGRAM(s) Oral at bedtime  dextrose 5%. 1000 milliLiter(s) (50 mL/Hr) IV Continuous <Continuous>  dextrose 50% Injectable 12.5 Gram(s) IV Push Once  dextrose 50% Injectable 25 Gram(s) IV Push Once  dextrose 50% Injectable 25 Gram(s) IV Push Once  emtricitabine 200 mG/tenofovir 300 mG (TRUVADA) 1 Tablet(s) Oral daily  insulin lispro (HumaLOG) corrective regimen sliding scale   SubCutaneous Before meals and at bedtime  norepinephrine Infusion 0.05 MICROgram(s)/kG/Min (8.344 mL/Hr) IV Continuous <Continuous>    MEDICATIONS  (PRN):  dextrose 40% Gel 15 Gram(s) Oral Once PRN Blood Glucose LESS THAN 70 milliGRAM(s)/deciliter  glucagon  Injectable 1 milliGRAM(s) IntraMuscular Once PRN Glucose LESS THAN 70 milligrams/deciliter      ALLERGIES:  Allergies    No Known Allergies    Intolerances        LABS:                        14.7   6.68  )-----------( 133      ( 08 Mar 2019 17:49 )             44.4     03-08    138  |  103  |  10  ----------------------------<  116<H>  4.2   |  25  |  0.66    Ca    8.8      08 Mar 2019 17:49    TPro  7.1  /  Alb  4.1  /  TBili  1.1  /  DBili  x   /  AST  19  /  ALT  16  /  AlkPhos  57  03-08    LIVER FUNCTIONS - ( 08 Mar 2019 17:49 )  Alb: 4.1 g/dL / Pro: 7.1 g/dL / ALK PHOS: 57 U/L / ALT: 16 U/L / AST: 19 U/L / GGT: x           PT/INR - ( 08 Mar 2019 16:29 )   PT: 23.3 sec;   INR: 2.02          PTT - ( 08 Mar 2019 16:29 )  PTT:>200.0 sec    CARDIAC MARKERS ( 08 Mar 2019 10:17 )  x     / x     / 94 U/L / x     / 1.6 ng/mL        RADIOLOGY & ADDITIONAL TESTS: Reviewed. ***********************  MICU Acceptance Note  ***********************  Hospital Course:  73 M with AFib on Coumadin (last dose 3/5), HFrEF (EF 25%) s/p ICD placement, HTN, HLD, CAD s/p PCI and 3 stents 3 yrs ago was taken to an outpatient elective cath with  today (diagnostic) prior to his VT ablation next week with  for ICD firing x2 in the past few months for sustained VT. Today post cath (he was given 50 Fentanyl and Versed for sedation) he was confused, aphasic dysarthric with right sided hemiplegia, stroke code was called (last known normal being 3.30PM), CT head, CTA both negative, however per neurology stroke attending hypoperfusion in M2 and M3 area noted. At the time his SBP was 113-123, tPA not given because INR>1.5. Patient was then given Narcan due to pinpoint pupils and started on phenylephrine to keep MAP>90 due to hypoperfused stroke. Patient's right sided weakness resolved, however some expressive aphasia transferred to the MICU for continued management.    SUBJECTIVE: Patient seen and examined at bedside. No complaints, denies fever nausea vomiting, chest pain, palpitations. Endorses improvement in symptoms.    OBJECTIVE:    VITAL SIGNS:  ICU Vital Signs Last 24 Hrs  T(C): 35.7 (08 Mar 2019 19:00), Max: 35.7 (08 Mar 2019 18:40)  T(F): 96.2 (08 Mar 2019 19:00), Max: 96.2 (08 Mar 2019 18:40)  HR: 54 (08 Mar 2019 19:00) (48 - 54)  BP: 132/67 (08 Mar 2019 19:00) (132/67 - 135/71)  BP(mean): 83 (08 Mar 2019 19:00) (83 - 90)  ABP: --  ABP(mean): --  RR: 13 (08 Mar 2019 19:00) (13 - 26)  SpO2: 98% (08 Mar 2019 19:00) (98% - 99%)        CAPILLARY BLOOD GLUCOSE  106 (08 Mar 2019 18:00)      POCT Blood Glucose.: 112 mg/dL (08 Mar 2019 18:43)      PHYSICAL EXAM:    General: WDWN ; NAD  HEENT: NC/AT; PERRL, anicteric sclera  Neck: supple, no JVD  Respiratory: CTA B/L; no W/R/R  Cardiovascular: +S1/S2; RRR; no M/R/G  Gastrointestinal: soft, NT/ND; +BS x4  Extremities: WWP; 2+ peripheral pulses B/L; no LE edema  Skin: normal color and turgor; no rash  Neurological:   Mental Status - AAOx3, follows commands,  CN II-XII grossly intact  Motor: 5/5 in all extremities  Sensation: intact bilaterally  Cerebellum: FTN intact bilaterally  Gait: Deferred    MEDICATIONS:  MEDICATIONS  (STANDING):  aspirin  chewable 81 milliGRAM(s) Oral daily  atorvastatin 40 milliGRAM(s) Oral at bedtime  dextrose 5%. 1000 milliLiter(s) (50 mL/Hr) IV Continuous <Continuous>  dextrose 50% Injectable 12.5 Gram(s) IV Push Once  dextrose 50% Injectable 25 Gram(s) IV Push Once  dextrose 50% Injectable 25 Gram(s) IV Push Once  emtricitabine 200 mG/tenofovir 300 mG (TRUVADA) 1 Tablet(s) Oral daily  insulin lispro (HumaLOG) corrective regimen sliding scale   SubCutaneous Before meals and at bedtime  norepinephrine Infusion 0.05 MICROgram(s)/kG/Min (8.344 mL/Hr) IV Continuous <Continuous>    MEDICATIONS  (PRN):  dextrose 40% Gel 15 Gram(s) Oral Once PRN Blood Glucose LESS THAN 70 milliGRAM(s)/deciliter  glucagon  Injectable 1 milliGRAM(s) IntraMuscular Once PRN Glucose LESS THAN 70 milligrams/deciliter      ALLERGIES:  Allergies    No Known Allergies    Intolerances        LABS:                        14.7   6.68  )-----------( 133      ( 08 Mar 2019 17:49 )             44.4     03-08    138  |  103  |  10  ----------------------------<  116<H>  4.2   |  25  |  0.66    Ca    8.8      08 Mar 2019 17:49    TPro  7.1  /  Alb  4.1  /  TBili  1.1  /  DBili  x   /  AST  19  /  ALT  16  /  AlkPhos  57  03-08    LIVER FUNCTIONS - ( 08 Mar 2019 17:49 )  Alb: 4.1 g/dL / Pro: 7.1 g/dL / ALK PHOS: 57 U/L / ALT: 16 U/L / AST: 19 U/L / GGT: x           PT/INR - ( 08 Mar 2019 16:29 )   PT: 23.3 sec;   INR: 2.02          PTT - ( 08 Mar 2019 16:29 )  PTT:>200.0 sec    CARDIAC MARKERS ( 08 Mar 2019 10:17 )  x     / x     / 94 U/L / x     / 1.6 ng/mL        RADIOLOGY & ADDITIONAL TESTS: Reviewed.

## 2019-03-08 NOTE — PROGRESS NOTE ADULT - ASSESSMENT
a 73 y o male with FMH of heart diseases with PMH of Afib (on Coumadin last dose on 3/5/19), s/p ICD placement in [facility] in 2017, HTN, HLD, DM, CAD (s/p stent placement in 1994 in [facility], pt was told to bring card) presented for elective cath. The patient presented with sudden onset of R sided weakness, facial droop, word finding difficulty and naming, non expressive aphasia.   Dr. Lee reviewed the CTH and CTA head/neck. CTH WNL, CTA head with possible poor flow and perfusion to M2-M3 region, which can be causing the sx.     The patient received narcan as well as he was opiate naive once CT was performed, and the pt became more alert, could move both extremities, and was more awake and no longer lethargic. However, dysarthria was still present. Started on phenylephrine gtt and the patient dysarthria started to improve as the MAPs increased. tPA was held in the setting of elevated INR and PTT.     May be 2/2 hypoperfusion, therefore recommend:  - Maintaining MAP >90  - continue to phenylephrine gtt   - Held integrillin at this time as pt improved remarkably as BP increased. However, if sx start to worsen, notify attending and start integrillin gtt A/P:  74 yo M with AFib on Coumadin (last dose 3/5), HFrEF (EF 25%) s/p ICD placement, HTN, HLD, CAD s/p PCI and 3 stents 3 yrs ago was taken to an outpatient elective cath with Dr.Kim singh (diagnostic) prior to his VT ablation next week with  for ICD firing x2 in the past few months for sustained VT.     #NEUROLOGY   r/o CVA - despite CT head and CTA negative, suspicion for hypoperfusion in setting of borderline . Last known normal 3.30 PM, pt now back to baseline w/o any right sided deficit, still with minimal word finding difficulty. Pt not candidate for tPA because INR>1.5. Now has been on phenylephrine with MAPs 90 and deficits resolving.  -continue management per stroke: keep MAP in 90s  -might need Integrilin gtt if symptoms recurr  -MRI head w/o contrast not possible as patient has ICD  -continue Lipitor 80, pt received ASA 81 and Plavix  -repeat CT head per neuro    AMS - now resolved, possibly 2/2 Fentanyl (pt is opioid naive) and after Narcan pt returned to baseline.  -neuro check q1hr  -[id number]  -avoid opioids      #CARDIAC  HFrEF (EF 25%) with ICD in place. Patient was initially on phenylephrine, however in setting of low output this is not ideal as it would decrease CO even more given poor EF.  -switch phenylephrine to Levophed  -watch on tele as Levo can precipitate VT in this patient who already has known VT  -given goal is to keep BP higher now for neuroprotection would hold Entresto (home medication)  -pt euvolemic on exam, no need for diuresis  -avoid IVF given low EF    VT - s/p 2x shock in last few months  - As per Dr John patient to c/w Sotalol home dose    CAD s/p PCI  - continue home ASA  - Hold Plavix in the setting of elevatd INR    #ENDO  DM - home on repaglinide and metformin  -MDSS and weight dosed insulin    HLD   -continue home Zetia and Lipitor    METABOLISM  F: none  E: replete PRN K<4, Mg<2  N: DASH/TLC      DISPO: Transfer to MICU under   CODE: Full

## 2019-03-08 NOTE — CHART NOTE - NSCHARTNOTEFT_GEN_A_CORE
Stroke code called at cath holding area around 3:30 pm; Pt. seen at bedside laying along with Dr. Rashid; pt. with slurred speech; R sided facial droop and R sided weakness. VSS /68; RR 16 HR 71 02 98 RA, ; As per nurse Quach; pt. exhibited Right-sided weakness and slurred speech as pt. was transferring from procedure table. Pt. s/p diagnostic cardiac cath 3/8/19 with IFR negative Ramus; In procedure room; pt. received Versed 1 mg IV X 1; Fentanyl 25 mg IV X 2 and hep 8000 units X 1 ; Stroke team at bedside Pt. taken for CT head stroke protocol; CTA neck and head. Pt. brought back to holding area after test. Dr. Lee reviewed  the studies; CT head negative for bleed; CTA head revealing poor perfusion to M2-M3 region.  In holding area; pt. was given Narcan 0.8 mg  IV x 1 ; after narcan; pt. became more alert; was able to lift his b/l arms and legs up.  However; pt. still had  dysarthia; BP at baseline 113-120s; as per Dr. Lee; pt. satrted on gregorio gtt;  250 cc bolus of fluids and pt to be transferred to MICU service. Holding TPA and integrillin at this time; if symptoms worsen or doesn't improve , pt. to be started on Integrillin gtt as per Dr. Lee.  Repeat Stat INR was 2.0; PTT > 200; initial INR on arrival was 1.7. MICU team aware. Pt. will be admitted under MICU team in CCU 2/2 no bed available; R rad due @ 6: 15 pm; interventional cardiac team will remove the band.

## 2019-03-08 NOTE — PATIENT PROFILE ADULT - ARE SIGNIFICANT INDICATORS COMPLETE.
Last seen: 10/26/17 by Dr. Cm  Next appt: 03/01/18 with Dr. Cm    Was the patient seen in the last year in this department? Yes   Does patient have an active prescription for medications requested? No   Received Request Via: Pharmacy   Yes
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
## 2019-03-08 NOTE — CONSULT NOTE ADULT - SUBJECTIVE AND OBJECTIVE BOX
ICU CONSULT NOTE  74 yo M with AFib on Coumadin (last dose 3/5), HFrEF (EF 25%) s/p ICD placement    Past Medical History:  Past Surgical History:  Medications:  Allergies:  Social History:  Family History:    Physical Examination:   Vital Signs Last 24 Hrs  T(C): --  T(F): --  HR: --  BP: --  BP(mean): --  RR: --  SpO2: --  I&O's Detail    CAPILLARY BLOOD GLUCOSE  106 (08 Mar 2019 18:00)      POCT Blood Glucose.: 112 mg/dL (08 Mar 2019 18:43)  POCT Blood Glucose.: 106 mg/dL (08 Mar 2019 15:41)    General:  HEENT:  Neck:  Heart:  Lungs:  Abdomen:  Rectal:  Back:  Genitourinary:  Extremities:  Neurological:  Skin:     Labs/Imaging:       CARDIAC MARKERS ( 08 Mar 2019 10:17 )  x     / x     / 94 U/L / x     / 1.6 ng/mL      CBC Full  -  ( 08 Mar 2019 17:49 )  WBC Count : 6.68 K/uL  Hemoglobin : 14.7 g/dL  Hematocrit : 44.4 %  Platelet Count - Automated : 133 K/uL  Mean Cell Volume : 98.0 fl  Mean Cell Hemoglobin : 32.5 pg  Mean Cell Hemoglobin Concentration : 33.1 gm/dL  Auto Neutrophil # : x  Auto Lymphocyte # : x  Auto Monocyte # : x  Auto Eosinophil # : x  Auto Basophil # : x  Auto Neutrophil % : x  Auto Lymphocyte % : x  Auto Monocyte % : x  Auto Eosinophil % : x  Auto Basophil % : x    03-08    138  |  103  |  10  ----------------------------<  116<H>  4.2   |  25  |  0.66    Ca    8.8      08 Mar 2019 17:49    TPro  7.1  /  Alb  4.1  /  TBili  1.1  /  DBili  x   /  AST  19  /  ALT  16  /  AlkPhos  57  03-08    LIVER FUNCTIONS - ( 08 Mar 2019 17:49 )  Alb: 4.1 g/dL / Pro: 7.1 g/dL / ALK PHOS: 57 U/L / ALT: 16 U/L / AST: 19 U/L / GGT: x           PT/INR - ( 08 Mar 2019 16:29 )   PT: 23.3 sec;   INR: 2.02          PTT - ( 08 Mar 2019 16:29 )  PTT:>200.0 sec ICU CONSULT NOTE  72 yo M with AFib on Coumadin (last dose 3/5), HFrEF (EF 25%) s/p ICD placement, HTN, HLD, CAD s/p PCI and 3 stents 3 yrs ago was taken to an outpatient elective cath with Dr.Kim singh (diagnostic) prior to his VT ablation next week with  for ICD firing x2 in the past few months for sustained VT. Today post cath (he was given 50 Fentanyl and Versed for sedation) he was confused, aphasic dysarthric with right sided hemiplegia, stroke code was called (last known normal being 3.30PM), CT head, CTA both negative, however per neurology stroke attending hypoperfusion in M2 and M3 area noted. At the time his SBP was 113-123, tPA not given because INR>1.5. Patient was then given Narcan due to pinpoint pupils and started on phenylephrine to keep MAP>90 due to hypoperfused stroke.    Past Medical History:  Past Surgical History:  Medications:  Allergies:  Social History:  Family History:    Physical Examination:   Vital Signs Last 24 Hrs  T(C): --  T(F): --  HR: --  BP: --  BP(mean): --  RR: --  SpO2: --  I&O's Detail    CAPILLARY BLOOD GLUCOSE  106 (08 Mar 2019 18:00)      POCT Blood Glucose.: 112 mg/dL (08 Mar 2019 18:43)  POCT Blood Glucose.: 106 mg/dL (08 Mar 2019 15:41)    General:  HEENT:  Neck:  Heart:  Lungs:  Abdomen:  Rectal:  Back:  Genitourinary:  Extremities:  Neurological:  Skin:     Labs/Imaging:       CARDIAC MARKERS ( 08 Mar 2019 10:17 )  x     / x     / 94 U/L / x     / 1.6 ng/mL      CBC Full  -  ( 08 Mar 2019 17:49 )  WBC Count : 6.68 K/uL  Hemoglobin : 14.7 g/dL  Hematocrit : 44.4 %  Platelet Count - Automated : 133 K/uL  Mean Cell Volume : 98.0 fl  Mean Cell Hemoglobin : 32.5 pg  Mean Cell Hemoglobin Concentration : 33.1 gm/dL  Auto Neutrophil # : x  Auto Lymphocyte # : x  Auto Monocyte # : x  Auto Eosinophil # : x  Auto Basophil # : x  Auto Neutrophil % : x  Auto Lymphocyte % : x  Auto Monocyte % : x  Auto Eosinophil % : x  Auto Basophil % : x    03-08    138  |  103  |  10  ----------------------------<  116<H>  4.2   |  25  |  0.66    Ca    8.8      08 Mar 2019 17:49    TPro  7.1  /  Alb  4.1  /  TBili  1.1  /  DBili  x   /  AST  19  /  ALT  16  /  AlkPhos  57  03-08    LIVER FUNCTIONS - ( 08 Mar 2019 17:49 )  Alb: 4.1 g/dL / Pro: 7.1 g/dL / ALK PHOS: 57 U/L / ALT: 16 U/L / AST: 19 U/L / GGT: x           PT/INR - ( 08 Mar 2019 16:29 )   PT: 23.3 sec;   INR: 2.02          PTT - ( 08 Mar 2019 16:29 )  PTT:>200.0 sec ICU CONSULT NOTE  74 yo M with AFib on Coumadin (last dose 3/5), HFrEF (EF 25%) s/p ICD placement, HTN, HLD, CAD s/p PCI and 3 stents 3 yrs ago was taken to an outpatient elective cath with Dr.Kim singh (diagnostic) prior to his VT ablation next week with  for ICD firing x2 in the past few months for sustained VT. Today post cath (he was given 50 Fentanyl and Versed for sedation) he was confused, aphasic dysarthric with right sided hemiplegia, stroke code was called (last known normal being 3.30PM), CT head, CTA both negative, however per neurology stroke attending hypoperfusion in M2 and M3 area noted. At the time his SBP was 113-123, tPA not given because INR>1.5. Patient was then given Narcan due to pinpoint pupils and started on phenylephrine to keep MAP>90 due to hypoperfused stroke. Since then patient's right sided weakness has completely resolved, however still some expressive aphasia present.        Physical Examination:   Vital Signs Last 24 Hrs  T(C): --  T(F): --  HR: --  BP: --  BP(mean): --  RR: --  SpO2: --  I&O's Detail    CAPILLARY BLOOD GLUCOSE  106 (08 Mar 2019 18:00)      POCT Blood Glucose.: 112 mg/dL (08 Mar 2019 18:43)  POCT Blood Glucose.: 106 mg/dL (08 Mar 2019 15:41)    PHYSICAL EXAM:  Constitutional: NAD, well-groomed, well-developed  HEENT: PERRLA, EOMI, Normal Hearing, MMM  Neck: No LAD, No JVD  Back: Normal spine flexure, No CVA tenderness  Respiratory: CTAB  Cardiovascular: S1 and S2, RRR, no M/G/R  Gastrointestinal: BS+, soft, NT/ND  Extremities: No peripheral edema, right radial band in place  Vascular: 2+ peripheral pulses  Neurologic:  Mental status: Awake, alert and oriented x3.  Recent and remote memory intact.  Naming, repetition and comprehension intact.   Cranial nerves: Fundoscopic exam demonstrated no abnormalities, pupils equally round and reactive to light, visual fields full, no nystagmus, extraocular muscles intact, V1 through V3 intact bilaterally and symmetric, face symmetric, hearing intact to finger rub, palate elevation symmetric, tongue was midline, sternocleidomastoid/shoulder shrug strength bilaterally 5/5.    Motor:  Normal bulk and tone, strength 5/5 in bilateral upper and lower extremities.   strength 5/5.    Sensation: Intact to light touch, proprioception, and pinprick.  No neglect.   Reflexes: 2+ in upper and lower extremities, downgoing toes bilaterally  Psychiatric: Normal mood, normal affect  Musculoskeletal: 5/5 strength b/l upper and lower extremities  Skin: No rashes      Labs/Imaging:       CARDIAC MARKERS ( 08 Mar 2019 10:17 )  x     / x     / 94 U/L / x     / 1.6 ng/mL      CBC Full  -  ( 08 Mar 2019 17:49 )  WBC Count : 6.68 K/uL  Hemoglobin : 14.7 g/dL  Hematocrit : 44.4 %  Platelet Count - Automated : 133 K/uL  Mean Cell Volume : 98.0 fl  Mean Cell Hemoglobin : 32.5 pg  Mean Cell Hemoglobin Concentration : 33.1 gm/dL  Auto Neutrophil # : x  Auto Lymphocyte # : x  Auto Monocyte # : x  Auto Eosinophil # : x  Auto Basophil # : x  Auto Neutrophil % : x  Auto Lymphocyte % : x  Auto Monocyte % : x  Auto Eosinophil % : x  Auto Basophil % : x    03-08    138  |  103  |  10  ----------------------------<  116<H>  4.2   |  25  |  0.66    Ca    8.8      08 Mar 2019 17:49    TPro  7.1  /  Alb  4.1  /  TBili  1.1  /  DBili  x   /  AST  19  /  ALT  16  /  AlkPhos  57  03-08    LIVER FUNCTIONS - ( 08 Mar 2019 17:49 )  Alb: 4.1 g/dL / Pro: 7.1 g/dL / ALK PHOS: 57 U/L / ALT: 16 U/L / AST: 19 U/L / GGT: x           PT/INR - ( 08 Mar 2019 16:29 )   PT: 23.3 sec;   INR: 2.02          PTT - ( 08 Mar 2019 16:29 )  PTT:>200.0 sec    A/P:  74 yo M with AFib on Coumadin (last dose 3/5), HFrEF (EF 25%) s/p ICD placement, HTN, HLD, CAD s/p PCI and 3 stents 3 yrs ago was taken to an outpatient elective cath with Dr.Gay today (diagnostic) prior to his VT ablation next week with  for ICD firing x2 in the past few months for sustained VT.     #NEUROLOGY   r/o CVA - despite CT head and CTA negative, suspicion for hypoperfusion in setting of borderline . Last known normal 3.30 PM, pt now back to baseline w/o any right sided deficit, still with minimal word finding difficulty. Pt not candidate for tPA because INR>1.5. Now has been on phenylephrine with MAPs 90 and deficits resolving.  -continue management per stroke: keep MAP in 90s  -might need Integrilin gtt if symptoms recurr  -MRI head w/o contrast not possible as patient has ICD  -continue Lipitor 80, pt received ASA 81 and Plavix today already ICU CONSULT NOTE  72 yo M with AFib on Coumadin (last dose 3/5), HFrEF (EF 25%) s/p ICD placement, HTN, HLD, CAD s/p PCI and 3 stents 3 yrs ago was taken to an outpatient elective cath with Dr.Kim singh (diagnostic) prior to his VT ablation next week with  for ICD firing x2 in the past few months for sustained VT. Today post cath (he was given 50 Fentanyl and Versed for sedation) he was confused, aphasic dysarthric with right sided hemiplegia, stroke code was called (last known normal being 3.30PM), CT head, CTA both negative, however per neurology stroke attending hypoperfusion in M2 and M3 area noted. At the time his SBP was 113-123, tPA not given because INR>1.5. Patient was then given Narcan due to pinpoint pupils and started on phenylephrine to keep MAP>90 due to hypoperfused stroke. Since then patient's right sided weakness has completely resolved, however still some expressive aphasia present.        Physical Examination:   Vital Signs Last 24 Hrs  T(C): --  T(F): --  HR: --  BP: --  BP(mean): --  RR: --  SpO2: --  I&O's Detail    CAPILLARY BLOOD GLUCOSE  106 (08 Mar 2019 18:00)      POCT Blood Glucose.: 112 mg/dL (08 Mar 2019 18:43)  POCT Blood Glucose.: 106 mg/dL (08 Mar 2019 15:41)    PHYSICAL EXAM:  Constitutional: NAD, well-groomed, well-developed  HEENT: PERRLA, EOMI, Normal Hearing, MMM  Neck: No LAD, No JVD  Back: Normal spine flexure, No CVA tenderness  Respiratory: CTAB  Cardiovascular: S1 and S2, RRR, no M/G/R  Gastrointestinal: BS+, soft, NT/ND  Extremities: No peripheral edema, right radial band in place  Vascular: 2+ peripheral pulses  Neurologic:  Mental status: Awake, alert and oriented x3.  Recent and remote memory intact.  Naming, repetition and comprehension intact.   Cranial nerves: Fundoscopic exam demonstrated no abnormalities, pupils equally round and reactive to light, visual fields full, no nystagmus, extraocular muscles intact, V1 through V3 intact bilaterally and symmetric, face symmetric, hearing intact to finger rub, palate elevation symmetric, tongue was midline, sternocleidomastoid/shoulder shrug strength bilaterally 5/5.    Motor:  Normal bulk and tone, strength 5/5 in bilateral upper and lower extremities.   strength 5/5.    Sensation: Intact to light touch, proprioception, and pinprick.  No neglect.   Reflexes: 2+ in upper and lower extremities, downgoing toes bilaterally  Psychiatric: Normal mood, normal affect  Musculoskeletal: 5/5 strength b/l upper and lower extremities  Skin: No rashes      Labs/Imaging:       CARDIAC MARKERS ( 08 Mar 2019 10:17 )  x     / x     / 94 U/L / x     / 1.6 ng/mL      CBC Full  -  ( 08 Mar 2019 17:49 )  WBC Count : 6.68 K/uL  Hemoglobin : 14.7 g/dL  Hematocrit : 44.4 %  Platelet Count - Automated : 133 K/uL  Mean Cell Volume : 98.0 fl  Mean Cell Hemoglobin : 32.5 pg  Mean Cell Hemoglobin Concentration : 33.1 gm/dL  Auto Neutrophil # : x  Auto Lymphocyte # : x  Auto Monocyte # : x  Auto Eosinophil # : x  Auto Basophil # : x  Auto Neutrophil % : x  Auto Lymphocyte % : x  Auto Monocyte % : x  Auto Eosinophil % : x  Auto Basophil % : x    03-08    138  |  103  |  10  ----------------------------<  116<H>  4.2   |  25  |  0.66    Ca    8.8      08 Mar 2019 17:49    TPro  7.1  /  Alb  4.1  /  TBili  1.1  /  DBili  x   /  AST  19  /  ALT  16  /  AlkPhos  57  03-08    LIVER FUNCTIONS - ( 08 Mar 2019 17:49 )  Alb: 4.1 g/dL / Pro: 7.1 g/dL / ALK PHOS: 57 U/L / ALT: 16 U/L / AST: 19 U/L / GGT: x           PT/INR - ( 08 Mar 2019 16:29 )   PT: 23.3 sec;   INR: 2.02          PTT - ( 08 Mar 2019 16:29 )  PTT:>200.0 sec    A/P:  72 yo M with AFib on Coumadin (last dose 3/5), HFrEF (EF 25%) s/p ICD placement, HTN, HLD, CAD s/p PCI and 3 stents 3 yrs ago was taken to an outpatient elective cath with Dr.Gay today (diagnostic) prior to his VT ablation next week with  for ICD firing x2 in the past few months for sustained VT.     #NEUROLOGY   r/o CVA - despite CT head and CTA negative, suspicion for hypoperfusion in setting of borderline . Last known normal 3.30 PM, pt now back to baseline w/o any right sided deficit, still with minimal word finding difficulty. Pt not candidate for tPA because INR>1.5. Now has been on phenylephrine with MAPs 90 and deficits resolving.  -continue management per stroke: keep MAP in 90s  -might need Integrilin gtt if symptoms recurr  -MRI head w/o contrast not possible as patient has ICD  -continue Lipitor 80, pt received ASA 81 and Plavix today already  -repeat CT head per neuro    AMS - now resolved, possibly 2/2 Fentanyl (pt is opioid naive) and after Narcan pt returned to baseline.  -neuro check q1hr  -[id number]  -avoid opioids      #CARDIAC  HFrEF (EF 25%) with ICD in place. Patient was initially on phenylephrine, however in setting of low output this is not ideal as it would decrease CO even more given poor EF.  -switch phenylephrine to Levophed  -watch on tele as Levo can precipitate VT in this patient who already has known VT  -given goal is to keep BP higher now for neuroprotection would hold Entresto (home medication)  -pt euvolemic on exam, no need for diuresis  -avoid IVF given low EF    VT - s/p 2x shock in last few months  -would check with  if to continue Sotalol home dose given recent possible CVA    CAD s/p PCI  -continue home ASA and Plavix    #ENDO  DM - home on repaglinide and metformin  -MDSS and weight dosed insulin    HLD   -continue home Zetia and Lipitor    DISPO: Transfer to MICU under  ICU CONSULT NOTE  74 yo M with AFib on Coumadin (last dose 3/5), HFrEF (EF 25%) s/p ICD placement, HTN, HLD, CAD s/p PCI and 3 stents 3 yrs ago was taken to an outpatient elective cath with Dr.Kim singh (diagnostic) prior to his VT ablation next week with  for ICD firing x2 in the past few months for sustained VT. Today post cath (he was given 50 Fentanyl and Versed for sedation) he was confused, aphasic dysarthric with right sided hemiplegia, stroke code was called (last known normal being 3.30PM), CT head, CTA both negative, however per neurology stroke attending hypoperfusion in M2 and M3 area noted. At the time his SBP was 113-123, tPA not given because INR>1.5. Patient was then given Narcan due to pinpoint pupils and started on phenylephrine to keep MAP>90 due to hypoperfused stroke. Since then patient's right sided weakness has completely resolved, however still some expressive aphasia present.    Home meds: Sotalol 120 mg TID, Lipitor 20 mg QD, Zetia 10 mg QD, Coumadin 2.5 mg QD (last dose 3/5), metformin 1000 mg BID, repaglinide 2 mg TID, Truvada 200/300 QD, Entresto QD    Physical Examination:   Vital Signs Last 24 Hrs  T(C): --  T(F): --  HR: --  BP: --  BP(mean): --  RR: --  SpO2: --  I&O's Detail    CAPILLARY BLOOD GLUCOSE  106 (08 Mar 2019 18:00)      POCT Blood Glucose.: 112 mg/dL (08 Mar 2019 18:43)  POCT Blood Glucose.: 106 mg/dL (08 Mar 2019 15:41)    PHYSICAL EXAM:  Constitutional: NAD, well-groomed, well-developed  HEENT: PERRLA, EOMI, Normal Hearing, MMM  Neck: No LAD, No JVD  Back: Normal spine flexure, No CVA tenderness  Respiratory: CTAB  Cardiovascular: S1 and S2, RRR, no M/G/R  Gastrointestinal: BS+, soft, NT/ND  Extremities: No peripheral edema, right radial band in place  Vascular: 2+ peripheral pulses  Neurologic:  Mental status: Awake, alert and oriented x3.  Recent and remote memory intact.  Naming, repetition and comprehension intact.   Cranial nerves: Fundoscopic exam demonstrated no abnormalities, pupils equally round and reactive to light, visual fields full, no nystagmus, extraocular muscles intact, V1 through V3 intact bilaterally and symmetric, face symmetric, hearing intact to finger rub, palate elevation symmetric, tongue was midline, sternocleidomastoid/shoulder shrug strength bilaterally 5/5.    Motor:  Normal bulk and tone, strength 5/5 in bilateral upper and lower extremities.   strength 5/5.    Sensation: Intact to light touch, proprioception, and pinprick.  No neglect.   Reflexes: 2+ in upper and lower extremities, downgoing toes bilaterally  Psychiatric: Normal mood, normal affect  Musculoskeletal: 5/5 strength b/l upper and lower extremities  Skin: No rashes      Labs/Imaging:       CARDIAC MARKERS ( 08 Mar 2019 10:17 )  x     / x     / 94 U/L / x     / 1.6 ng/mL      CBC Full  -  ( 08 Mar 2019 17:49 )  WBC Count : 6.68 K/uL  Hemoglobin : 14.7 g/dL  Hematocrit : 44.4 %  Platelet Count - Automated : 133 K/uL  Mean Cell Volume : 98.0 fl  Mean Cell Hemoglobin : 32.5 pg  Mean Cell Hemoglobin Concentration : 33.1 gm/dL  Auto Neutrophil # : x  Auto Lymphocyte # : x  Auto Monocyte # : x  Auto Eosinophil # : x  Auto Basophil # : x  Auto Neutrophil % : x  Auto Lymphocyte % : x  Auto Monocyte % : x  Auto Eosinophil % : x  Auto Basophil % : x    03-08    138  |  103  |  10  ----------------------------<  116<H>  4.2   |  25  |  0.66    Ca    8.8      08 Mar 2019 17:49    TPro  7.1  /  Alb  4.1  /  TBili  1.1  /  DBili  x   /  AST  19  /  ALT  16  /  AlkPhos  57  03-08    LIVER FUNCTIONS - ( 08 Mar 2019 17:49 )  Alb: 4.1 g/dL / Pro: 7.1 g/dL / ALK PHOS: 57 U/L / ALT: 16 U/L / AST: 19 U/L / GGT: x           PT/INR - ( 08 Mar 2019 16:29 )   PT: 23.3 sec;   INR: 2.02          PTT - ( 08 Mar 2019 16:29 )  PTT:>200.0 sec    A/P:  74 yo M with AFib on Coumadin (last dose 3/5), HFrEF (EF 25%) s/p ICD placement, HTN, HLD, CAD s/p PCI and 3 stents 3 yrs ago was taken to an outpatient elective cath with  today (diagnostic) prior to his VT ablation next week with  for ICD firing x2 in the past few months for sustained VT.     #NEUROLOGY   r/o CVA - despite CT head and CTA negative, suspicion for hypoperfusion in setting of borderline . Last known normal 3.30 PM, pt now back to baseline w/o any right sided deficit, still with minimal word finding difficulty. Pt not candidate for tPA because INR>1.5. Now has been on phenylephrine with MAPs 90 and deficits resolving.  -continue management per stroke: keep MAP in 90s  -might need Integrilin gtt if symptoms recurr  -MRI head w/o contrast not possible as patient has ICD  -continue Lipitor 80, pt received ASA 81 and Plavix today already  -repeat CT head per neuro    AMS - now resolved, possibly 2/2 Fentanyl (pt is opioid naive) and after Narcan pt returned to baseline.  -neuro check q1hr  -[id number]  -avoid opioids      #CARDIAC  HFrEF (EF 25%) with ICD in place. Patient was initially on phenylephrine, however in setting of low output this is not ideal as it would decrease CO even more given poor EF.  -switch phenylephrine to Levophed  -watch on tele as Levo can precipitate VT in this patient who already has known VT  -given goal is to keep BP higher now for neuroprotection would hold Entresto (home medication)  -pt euvolemic on exam, no need for diuresis  -avoid IVF given low EF    VT - s/p 2x shock in last few months  -would check with  if to continue Sotalol home dose given recent possible CVA    CAD s/p PCI  -continue home ASA and Plavix    #ENDO  DM - home on repaglinide and metformin  -MDSS and weight dosed insulin    HLD   -continue home Zetia and Lipitor    DISPO: Transfer to MICU under

## 2019-03-08 NOTE — CONSULT NOTE ADULT - ATTENDING COMMENTS
Patient seen in cath recovery - had BP of 105/60. aphasic, just says his name. right facial droop and right flacid hemiplegia.   CT head - no bleed.   CTA head and neck - no LVO  Patient given 0.8 of narcan with immediate recovery of sbp to 124 and wakes up and follows commands and Motor exam is 5/5. Still with non fluent language and naming difficulty and right left disorientation.    Admit to MICU  Start gregorio to keep sbp greater than 90  no TPA due to receiving  heparin 8000 units 1 hour before stroke noted. Patient seen in cath recovery - had BP of 105/60. aphasic, just says his name. right facial droop and right flacid hemiplegia.   CT head - no bleed.   CTA head and neck - no LVO  Patient given 0.8 of narcan with immediate recovery of sbp to 124 and wakes up and follows commands and Motor exam is 5/5. Still with non fluent language and naming difficulty and right left disorientation.    Admit to MICU  Start gregorio to keep map greater than 90  no TPA due to receiving  heparin 8000 units 1 hour before stroke noted.

## 2019-03-08 NOTE — PROGRESS NOTE ADULT - SUBJECTIVE AND OBJECTIVE BOX
Events of day noted-and discussed with Dr. Rashid, nurse Yanique in [facility], and patient/family  rapid recovery after narcan and BP raising and negative head CT  Localizing neuro signs still concerning for /transient embolic evenbt to left MCA  distribution-can't have brain MRI because of ICD  would favor repeat head CT tomorrow-particularly before re-starting anti-coagulation  I can be reached vis cell at [phone]

## 2019-03-08 NOTE — PATIENT PROFILE ADULT - NSASFALLATTEMPTOOB_GEN_A_NUR
"[facility]  [facility]~[address]~[place], [place]~[address]  Thank you for your [facility] visit today. If you have questions regarding your visit, please contact your cardiology RN's, Rosalva Kidd or Florecita Jose, at [phone]. Your provider has recommended the following:  Medication Changes:  None today. Continue taking your medications as you have been.  Recommendations:  Echocardiogram and non fasting labs in 3 months just prior to your follow up with Dr Calderón  Follow-up:  See Dr Calderón for cardiology follow up in 3 months  To schedule a future appointment, we kindly ask that you call cardiology scheduling at [phone] three months prior to requested revisit date.  [facility] cardiology clinic is staffed with \"Advance Practice Providers\". These are our cardiology Physician Assistants and Nurse Practitioners. Please call cardiology scheduling if you feel you need clinical evaluation with them at any time for any cardiac reason.                 Reminder:    For your safety, we ask that you bring in your current medication(s) or an updated list of your medications with you to EACH office visit. Include the medication name, dose of pill on bottle and how you are taking it. Include over-the-counter medications or supplements. Your provider will review this at each visit and plan your care based on your current information.       "
no

## 2019-03-09 LAB
ANION GAP SERPL CALC-SCNC: 12 MMOL/L — SIGNIFICANT CHANGE UP (ref 5–17)
APTT BLD: 29.7 SEC — SIGNIFICANT CHANGE UP (ref 27.5–36.3)
BASOPHILS # BLD AUTO: 0.06 K/UL — SIGNIFICANT CHANGE UP (ref 0–0.2)
BASOPHILS NFR BLD AUTO: 0.5 % — SIGNIFICANT CHANGE UP (ref 0–2)
BUN SERPL-MCNC: 13 MG/DL — SIGNIFICANT CHANGE UP (ref 7–23)
CALCIUM SERPL-MCNC: 8.8 MG/DL — SIGNIFICANT CHANGE UP (ref 8.4–10.5)
CHLORIDE SERPL-SCNC: 103 MMOL/L — SIGNIFICANT CHANGE UP (ref 96–108)
CHOLEST SERPL-MCNC: 87 MG/DL — SIGNIFICANT CHANGE UP (ref 10–199)
CO2 SERPL-SCNC: 23 MMOL/L — SIGNIFICANT CHANGE UP (ref 22–31)
CREAT SERPL-MCNC: 0.64 MG/DL — SIGNIFICANT CHANGE UP (ref 0.5–1.3)
EOSINOPHIL # BLD AUTO: 0.3 K/UL — SIGNIFICANT CHANGE UP (ref 0–0.5)
EOSINOPHIL NFR BLD AUTO: 2.6 % — SIGNIFICANT CHANGE UP (ref 0–6)
GLUCOSE BLDC GLUCOMTR-MCNC: 131 MG/DL — HIGH (ref 70–99)
GLUCOSE BLDC GLUCOMTR-MCNC: 144 MG/DL — HIGH (ref 70–99)
GLUCOSE BLDC GLUCOMTR-MCNC: 148 MG/DL — HIGH (ref 70–99)
GLUCOSE BLDC GLUCOMTR-MCNC: 256 MG/DL — HIGH (ref 70–99)
GLUCOSE SERPL-MCNC: 164 MG/DL — HIGH (ref 70–99)
HBA1C BLD-MCNC: 7.2 % — HIGH (ref 4–5.6)
HCT VFR BLD CALC: 44.7 % — SIGNIFICANT CHANGE UP (ref 39–50)
HDLC SERPL-MCNC: 28 MG/DL — LOW
HGB BLD-MCNC: 15.6 G/DL — SIGNIFICANT CHANGE UP (ref 13–17)
IMM GRANULOCYTES NFR BLD AUTO: 0.3 % — SIGNIFICANT CHANGE UP (ref 0–1.5)
INR BLD: 1.33 — HIGH (ref 0.88–1.16)
LIPID PNL WITH DIRECT LDL SERPL: 35 MG/DL — SIGNIFICANT CHANGE UP
LYMPHOCYTES # BLD AUTO: 27.3 % — SIGNIFICANT CHANGE UP (ref 13–44)
LYMPHOCYTES # BLD AUTO: 3.11 K/UL — SIGNIFICANT CHANGE UP (ref 1–3.3)
MAGNESIUM SERPL-MCNC: 1.9 MG/DL — SIGNIFICANT CHANGE UP (ref 1.6–2.6)
MCHC RBC-ENTMCNC: 33.3 PG — SIGNIFICANT CHANGE UP (ref 27–34)
MCHC RBC-ENTMCNC: 34.9 GM/DL — SIGNIFICANT CHANGE UP (ref 32–36)
MCV RBC AUTO: 95.5 FL — SIGNIFICANT CHANGE UP (ref 80–100)
MONOCYTES # BLD AUTO: 1.06 K/UL — HIGH (ref 0–0.9)
MONOCYTES NFR BLD AUTO: 9.3 % — SIGNIFICANT CHANGE UP (ref 2–14)
NEUTROPHILS # BLD AUTO: 6.83 K/UL — SIGNIFICANT CHANGE UP (ref 1.8–7.4)
NEUTROPHILS NFR BLD AUTO: 60 % — SIGNIFICANT CHANGE UP (ref 43–77)
NRBC # BLD: 0 /100 WBCS — SIGNIFICANT CHANGE UP (ref 0–0)
PLATELET # BLD AUTO: 155 K/UL — SIGNIFICANT CHANGE UP (ref 150–400)
POTASSIUM SERPL-MCNC: 3.9 MMOL/L — SIGNIFICANT CHANGE UP (ref 3.5–5.3)
POTASSIUM SERPL-SCNC: 3.9 MMOL/L — SIGNIFICANT CHANGE UP (ref 3.5–5.3)
PROTHROM AB SERPL-ACNC: 15.2 SEC — HIGH (ref 10–12.9)
RBC # BLD: 4.68 M/UL — SIGNIFICANT CHANGE UP (ref 4.2–5.8)
RBC # FLD: 12.1 % — SIGNIFICANT CHANGE UP (ref 10.3–14.5)
SODIUM SERPL-SCNC: 138 MMOL/L — SIGNIFICANT CHANGE UP (ref 135–145)
TOTAL CHOLESTEROL/HDL RATIO MEASUREMENT: 3.1 RATIO — LOW (ref 3.4–9.6)
TRIGL SERPL-MCNC: 121 MG/DL — SIGNIFICANT CHANGE UP (ref 10–149)
TSH SERPL-MCNC: 2.27 UIU/ML — SIGNIFICANT CHANGE UP (ref 0.35–4.94)
WBC # BLD: 11.39 K/UL — HIGH (ref 3.8–10.5)
WBC # FLD AUTO: 11.39 K/UL — HIGH (ref 3.8–10.5)

## 2019-03-09 PROCEDURE — 99233 SBSQ HOSP IP/OBS HIGH 50: CPT

## 2019-03-09 PROCEDURE — 93306 TTE W/DOPPLER COMPLETE: CPT | Mod: 26

## 2019-03-09 RX ORDER — SOTALOL HCL 120 MG
120 TABLET ORAL EVERY 8 HOURS
Qty: 0 | Refills: 0 | Status: DISCONTINUED | OUTPATIENT
Start: 2019-03-09 | End: 2019-03-13

## 2019-03-09 RX ORDER — NOREPINEPHRINE BITARTRATE/D5W 8 MG/250ML
0.05 PLASTIC BAG, INJECTION (ML) INTRAVENOUS
Qty: 8 | Refills: 0 | Status: DISCONTINUED | OUTPATIENT
Start: 2019-03-09 | End: 2019-03-10

## 2019-03-09 RX ORDER — INSULIN GLARGINE 100 [IU]/ML
10 INJECTION, SOLUTION SUBCUTANEOUS AT BEDTIME
Qty: 0 | Refills: 0 | Status: DISCONTINUED | OUTPATIENT
Start: 2019-03-09 | End: 2019-03-11

## 2019-03-09 RX ADMIN — EMTRICITABINE AND TENOFOVIR DISOPROXIL FUMARATE 1 TABLET(S): 200; 300 TABLET, FILM COATED ORAL at 11:38

## 2019-03-09 RX ADMIN — Medication 120 MILLIGRAM(S): at 06:27

## 2019-03-09 RX ADMIN — Medication 6: at 11:38

## 2019-03-09 RX ADMIN — ATORVASTATIN CALCIUM 40 MILLIGRAM(S): 80 TABLET, FILM COATED ORAL at 21:29

## 2019-03-09 RX ADMIN — INSULIN GLARGINE 10 UNIT(S): 100 INJECTION, SOLUTION SUBCUTANEOUS at 22:01

## 2019-03-09 RX ADMIN — Medication 120 MILLIGRAM(S): at 21:29

## 2019-03-09 RX ADMIN — Medication 81 MILLIGRAM(S): at 11:38

## 2019-03-09 RX ADMIN — Medication 120 MILLIGRAM(S): at 16:25

## 2019-03-09 NOTE — PROGRESS NOTE ADULT - ASSESSMENT
72 yo M with AFib on Coumadin (last dose 3/5), HFrEF (EF 25%) s/p ICD placement, HTN, HLD, CAD s/p PCI and 3 stents 3 yrs ago was taken to an outpatient elective cath with Dr.Kim singh (diagnostic) prior to his VT ablation next week with  for ICD firing x2 in the past few months for sustained VT.     #NEUROLOGY   r/o CVA - despite CT head and CTA negative, suspicion for hypoperfusion in setting of borderline . Last known normal 3.30 PM, pt now back to baseline w/o any right sided deficit, still with minimal word finding difficulty. Pt not candidate for tPA because INR>1.5.   - Deficits resolving  - continue management per stroke: MAP goal reduced to 80s  - might need Integrilin gtt if symptoms reoccur  - MRI head w/o contrast not possible as patient has ICD  - continue Lipitor 80, pt received ASA 81 and Plavix load 600mg yesterday  - repeat CT head today  - holding AC until after repeat CT    AMS - now resolved, possibly 2/2 Fentanyl (pt is opioid naive) and after Narcan pt returned to baseline.  - neuro check q1hr  - avoid opioids      #CARDIAC  HFrEF (EF 25%) with ICD in place. Patient was initially on phenylephrine, however in setting of low output this is not ideal as it would decrease CO even more given poor EF.  - c/w Levophed as need for reduced MAP goal 80  - watch on tele as Levo can precipitate VT in this patient who already has known VT  - given goal is to keep BP higher now for neuroprotection would hold Entresto (home medication)  - pt euvolemic on exam, no need for diuresis  - avoid IVF given low EF    VT - s/p 2x shock in last few months  - As per Dr John patient to c/w Sotalol home dose 120mg q8    CAD s/p PCI  - continue home ASA  - Hold Plavix in the setting of elevatd INR    #ENDO  DM - home on repaglinide and metformin  -MDSS and weight dosed insulin    HLD   -continue home Zetia and Lipitor    METABOLISM  F: none  E: replete PRN K<4, Mg<2  N: DASH/TLC      DISPO: MICU  CODE: Full 72 yo M with AFib on Coumadin (last dose 3/5), HFrEF (EF 25%) s/p ICD placement, HTN, HLD, CAD s/p PCI and 3 stents 3 yrs ago was taken to an outpatient elective cath with Dr.Kim singh (diagnostic) prior to his VT ablation next week with  for ICD firing x2 in the past few months for sustained VT.     #NEUROLOGY   r/o CVA - despite CT head and CTA negative, suspicion for hypoperfusion in setting of borderline . Last known normal 3.30 PM, pt now back to baseline w/o any right sided deficit, still with minimal word finding difficulty. Pt not candidate for tPA because INR>1.5.   - Deficits resolving  - continue management per stroke: MAP goal reduced to 80s  - might need Integrilin gtt if symptoms reoccur  - MRI head w/o contrast not possible as patient has ICD  - continue Lipitor 80, pt received ASA 81 and Plavix load 600mg yesterday  - repeat CT head today  - holding AC until after repeat CT    AMS - now resolved, possibly 2/2 Fentanyl (pt is opioid naive) and after Narcan pt returned to baseline.  - neuro check q1hr  - avoid opioids      #CARDIAC  HFrEF (EF 25%) with ICD in place. Patient was initially on phenylephrine, however in setting of low output this is not ideal as it would decrease CO even more given poor EF.  - c/w Levophed as need for reduced MAP goal 80  - watch on tele as Levo can precipitate VT in this patient who already has known VT  - given goal is to keep BP higher now for neuroprotection would hold Entresto (home medication)  - pt euvolemic on exam, no need for diuresis  - avoid IVF given low EF  - Severely reduced EF and severe LV dilation on repeat ECHO    VT - s/p 2x shock in last few months  - As per Dr John patient to c/w Sotalol home dose 120mg q8    CAD s/p PCI  - continue home ASA  - Hold Plavix in the setting of elevatd INR    #ENDO  DM - home on repaglinide and metformin  -MDSS and weight dosed insulin    HLD   -continue home Zetia and Lipitor    METABOLISM  F: none  E: replete PRN K<4, Mg<2  N: DASH/TLC      DISPO: MICU  CODE: Full

## 2019-03-09 NOTE — PROGRESS NOTE ADULT - SUBJECTIVE AND OBJECTIVE BOX
**INCOMPLETE NOTE    INTERVAL HPI/OVERNIGHT EVENTS:    OVERNIGHT: No overnight events.  SUBJECTIVE: Patient seen and examined at bedside.     ROS:  CV: Denies chest pain  Resp: Denies SOB  GI: Denies abdominal pain, constipation, diarrhea, nausea, vomiting  : Denies dysuria  ID: Denies fevers, chills  MSK: Denies joint pain     OBJECTIVE:    VITAL SIGNS:  ICU Vital Signs Last 24 Hrs  T(C): 36 (09 Mar 2019 05:55), Max: 36.6 (08 Mar 2019 21:00)  T(F): 96.8 (09 Mar 2019 05:55), Max: 97.8 (08 Mar 2019 21:00)  HR: 58 (09 Mar 2019 06:00) (42 - 58)  BP: 129/56 (09 Mar 2019 06:00) (116/55 - 140/73)  BP(mean): 86 (09 Mar 2019 06:00) (83 - 101)  ABP: --  ABP(mean): --  RR: 20 (09 Mar 2019 06:00) (13 - 26)  SpO2: 97% (09 Mar 2019 06:00) (96% - 99%)        03-08 @ 07:01  -  03-09 @ 06:16  --------------------------------------------------------  IN: 525 mL / OUT: 1000 mL / NET: -475 mL      CAPILLARY BLOOD GLUCOSE  106 (08 Mar 2019 18:00)      POCT Blood Glucose.: 148 mg/dL (09 Mar 2019 06:12)      PHYSICAL EXAM:  General: NAD, comfortable  HEENT: NCAT, PERRL, clear conjunctiva, no scleral icterus  Neck: supple, no JVD  Respiratory: CTA b/l, no wheezing, rhonchi, rales  Cardiovascular: RRR, normal S1S2, no M/R/G  Vascular: 2+ radial and DP pulses  Abdomen: soft, NT/ND, bowel sounds in all four quadrants, no palpable masses  Extremities: WWP, no clubbing, cyanosis, or edema  Skin: No rashes present  Neuro:     MEDICATIONS:  MEDICATIONS  (STANDING):  aspirin  chewable 81 milliGRAM(s) Oral daily  atorvastatin 40 milliGRAM(s) Oral at bedtime  dextrose 5%. 1000 milliLiter(s) (50 mL/Hr) IV Continuous <Continuous>  dextrose 50% Injectable 12.5 Gram(s) IV Push Once  dextrose 50% Injectable 25 Gram(s) IV Push Once  dextrose 50% Injectable 25 Gram(s) IV Push Once  emtricitabine 200 mG/tenofovir 300 mG (TRUVADA) 1 Tablet(s) Oral daily  insulin lispro (HumaLOG) corrective regimen sliding scale   SubCutaneous Before meals and at bedtime  norepinephrine Infusion 0.05 MICROgram(s)/kG/Min (8.344 mL/Hr) IV Continuous <Continuous>  sotalol 120 milliGRAM(s) Oral every 12 hours    MEDICATIONS  (PRN):  dextrose 40% Gel 15 Gram(s) Oral Once PRN Blood Glucose LESS THAN 70 milliGRAM(s)/deciliter  glucagon  Injectable 1 milliGRAM(s) IntraMuscular Once PRN Glucose LESS THAN 70 milligrams/deciliter      ALLERGIES:  Allergies    No Known Allergies    Intolerances        LABS:                        15.6   11.39 )-----------( 155      ( 09 Mar 2019 05:37 )             44.7     03-08    138  |  103  |  10  ----------------------------<  116<H>  4.2   |  25  |  0.66    Ca    8.8      08 Mar 2019 17:49    TPro  7.1  /  Alb  4.1  /  TBili  1.1  /  DBili  x   /  AST  19  /  ALT  16  /  AlkPhos  57  03-08    PT/INR - ( 08 Mar 2019 16:29 )   PT: 23.3 sec;   INR: 2.02          PTT - ( 08 Mar 2019 16:29 )  PTT:>200.0 sec      RADIOLOGY & ADDITIONAL TESTS: Reviewed. INTERVAL HPI/OVERNIGHT EVENTS:    OVERNIGHT: No overnight events.  SUBJECTIVE: Patient seen and examined at bedside. No complaints this AM, denies fever, chills, CP, SOB, nausea, vomiting, constipation or diarrhea.  No deficits remaining     OBJECTIVE:    VITAL SIGNS:  ICU Vital Signs Last 24 Hrs  T(C): 36 (09 Mar 2019 05:55), Max: 36.6 (08 Mar 2019 21:00)  T(F): 96.8 (09 Mar 2019 05:55), Max: 97.8 (08 Mar 2019 21:00)  HR: 58 (09 Mar 2019 06:00) (42 - 58)  BP: 129/56 (09 Mar 2019 06:00) (116/55 - 140/73)  BP(mean): 86 (09 Mar 2019 06:00) (83 - 101)  ABP: --  ABP(mean): --  RR: 20 (09 Mar 2019 06:00) (13 - 26)  SpO2: 97% (09 Mar 2019 06:00) (96% - 99%)        03-08 @ 07:01  -  03-09 @ 06:16  --------------------------------------------------------  IN: 525 mL / OUT: 1000 mL / NET: -475 mL      CAPILLARY BLOOD GLUCOSE  106 (08 Mar 2019 18:00)      POCT Blood Glucose.: 148 mg/dL (09 Mar 2019 06:12)      PHYSICAL EXAM:  General: NAD, comfortable sitting up in bed eating  HEENT: NCAT, PERRL, clear conjunctiva, no scleral icterus  Neck: supple, no JVD  Respiratory: CTA b/l, no wheezing, rhonchi, rales  Cardiovascular: RRR, normal S1S2, no M/R/G  Vascular: 2+ radial and DP pulses  Abdomen: soft, NT/ND, bowel sounds in all four quadrants, no palpable masses  Extremities: WWP, no clubbing, cyanosis, or edema  Skin: No rashes present  Neuro: A&Ox3, CN II-XII grossly intact, 5/5 strength throughout, sensation intact, gait deferred.    MEDICATIONS:  MEDICATIONS  (STANDING):  aspirin  chewable 81 milliGRAM(s) Oral daily  atorvastatin 40 milliGRAM(s) Oral at bedtime  dextrose 5%. 1000 milliLiter(s) (50 mL/Hr) IV Continuous <Continuous>  dextrose 50% Injectable 12.5 Gram(s) IV Push Once  dextrose 50% Injectable 25 Gram(s) IV Push Once  dextrose 50% Injectable 25 Gram(s) IV Push Once  emtricitabine 200 mG/tenofovir 300 mG (TRUVADA) 1 Tablet(s) Oral daily  insulin lispro (HumaLOG) corrective regimen sliding scale   SubCutaneous Before meals and at bedtime  norepinephrine Infusion 0.05 MICROgram(s)/kG/Min (8.344 mL/Hr) IV Continuous <Continuous>  sotalol 120 milliGRAM(s) Oral every 12 hours    MEDICATIONS  (PRN):  dextrose 40% Gel 15 Gram(s) Oral Once PRN Blood Glucose LESS THAN 70 milliGRAM(s)/deciliter  glucagon  Injectable 1 milliGRAM(s) IntraMuscular Once PRN Glucose LESS THAN 70 milligrams/deciliter      ALLERGIES:  Allergies    No Known Allergies    Intolerances        LABS:                        15.6   11.39 )-----------( 155      ( 09 Mar 2019 05:37 )             44.7     03-08    138  |  103  |  10  ----------------------------<  116<H>  4.2   |  25  |  0.66    Ca    8.8      08 Mar 2019 17:49    TPro  7.1  /  Alb  4.1  /  TBili  1.1  /  DBili  x   /  AST  19  /  ALT  16  /  AlkPhos  57  03-08    PT/INR - ( 08 Mar 2019 16:29 )   PT: 23.3 sec;   INR: 2.02          PTT - ( 08 Mar 2019 16:29 )  PTT:>200.0 sec      RADIOLOGY & ADDITIONAL TESTS: Reviewed.

## 2019-03-10 LAB
ANION GAP SERPL CALC-SCNC: 14 MMOL/L — SIGNIFICANT CHANGE UP (ref 5–17)
APTT BLD: 28.7 SEC — SIGNIFICANT CHANGE UP (ref 27.5–36.3)
BUN SERPL-MCNC: 14 MG/DL — SIGNIFICANT CHANGE UP (ref 7–23)
CALCIUM SERPL-MCNC: 9 MG/DL — SIGNIFICANT CHANGE UP (ref 8.4–10.5)
CHLORIDE SERPL-SCNC: 102 MMOL/L — SIGNIFICANT CHANGE UP (ref 96–108)
CO2 SERPL-SCNC: 22 MMOL/L — SIGNIFICANT CHANGE UP (ref 22–31)
CREAT SERPL-MCNC: 0.62 MG/DL — SIGNIFICANT CHANGE UP (ref 0.5–1.3)
GLUCOSE BLDC GLUCOMTR-MCNC: 124 MG/DL — HIGH (ref 70–99)
GLUCOSE BLDC GLUCOMTR-MCNC: 150 MG/DL — HIGH (ref 70–99)
GLUCOSE BLDC GLUCOMTR-MCNC: 161 MG/DL — HIGH (ref 70–99)
GLUCOSE BLDC GLUCOMTR-MCNC: 202 MG/DL — HIGH (ref 70–99)
GLUCOSE SERPL-MCNC: 165 MG/DL — HIGH (ref 70–99)
HCT VFR BLD CALC: 44.4 % — SIGNIFICANT CHANGE UP (ref 39–50)
HGB BLD-MCNC: 15.1 G/DL — SIGNIFICANT CHANGE UP (ref 13–17)
INR BLD: 1.29 — HIGH (ref 0.88–1.16)
MAGNESIUM SERPL-MCNC: 1.8 MG/DL — SIGNIFICANT CHANGE UP (ref 1.6–2.6)
MCHC RBC-ENTMCNC: 32.5 PG — SIGNIFICANT CHANGE UP (ref 27–34)
MCHC RBC-ENTMCNC: 34 GM/DL — SIGNIFICANT CHANGE UP (ref 32–36)
MCV RBC AUTO: 95.7 FL — SIGNIFICANT CHANGE UP (ref 80–100)
NRBC # BLD: 0 /100 WBCS — SIGNIFICANT CHANGE UP (ref 0–0)
PLATELET # BLD AUTO: 157 K/UL — SIGNIFICANT CHANGE UP (ref 150–400)
POTASSIUM SERPL-MCNC: 3.9 MMOL/L — SIGNIFICANT CHANGE UP (ref 3.5–5.3)
POTASSIUM SERPL-SCNC: 3.9 MMOL/L — SIGNIFICANT CHANGE UP (ref 3.5–5.3)
PROTHROM AB SERPL-ACNC: 14.7 SEC — HIGH (ref 10–12.9)
RBC # BLD: 4.64 M/UL — SIGNIFICANT CHANGE UP (ref 4.2–5.8)
RBC # FLD: 12.4 % — SIGNIFICANT CHANGE UP (ref 10.3–14.5)
SODIUM SERPL-SCNC: 138 MMOL/L — SIGNIFICANT CHANGE UP (ref 135–145)
WBC # BLD: 9.86 K/UL — SIGNIFICANT CHANGE UP (ref 3.8–10.5)
WBC # FLD AUTO: 9.86 K/UL — SIGNIFICANT CHANGE UP (ref 3.8–10.5)

## 2019-03-10 PROCEDURE — 70450 CT HEAD/BRAIN W/O DYE: CPT | Mod: 26

## 2019-03-10 PROCEDURE — 99233 SBSQ HOSP IP/OBS HIGH 50: CPT

## 2019-03-10 RX ORDER — MAGNESIUM SULFATE 500 MG/ML
1 VIAL (ML) INJECTION ONCE
Qty: 0 | Refills: 0 | Status: COMPLETED | OUTPATIENT
Start: 2019-03-10 | End: 2019-03-10

## 2019-03-10 RX ORDER — HEPARIN SODIUM 5000 [USP'U]/ML
1200 INJECTION INTRAVENOUS; SUBCUTANEOUS
Qty: 25000 | Refills: 0 | Status: DISCONTINUED | OUTPATIENT
Start: 2019-03-10 | End: 2019-03-11

## 2019-03-10 RX ORDER — NOREPINEPHRINE BITARTRATE/D5W 8 MG/250ML
0.05 PLASTIC BAG, INJECTION (ML) INTRAVENOUS
Qty: 8 | Refills: 0 | Status: DISCONTINUED | OUTPATIENT
Start: 2019-03-10 | End: 2019-03-11

## 2019-03-10 RX ORDER — POTASSIUM CHLORIDE 20 MEQ
20 PACKET (EA) ORAL ONCE
Qty: 0 | Refills: 0 | Status: COMPLETED | OUTPATIENT
Start: 2019-03-10 | End: 2019-03-10

## 2019-03-10 RX ADMIN — Medication 120 MILLIGRAM(S): at 14:35

## 2019-03-10 RX ADMIN — Medication 4: at 12:12

## 2019-03-10 RX ADMIN — Medication 8.34 MICROGRAM(S)/KG/MIN: at 03:00

## 2019-03-10 RX ADMIN — Medication 120 MILLIGRAM(S): at 22:06

## 2019-03-10 RX ADMIN — HEPARIN SODIUM 12 UNIT(S)/HR: 5000 INJECTION INTRAVENOUS; SUBCUTANEOUS at 20:25

## 2019-03-10 RX ADMIN — Medication 100 GRAM(S): at 07:34

## 2019-03-10 RX ADMIN — Medication 120 MILLIGRAM(S): at 06:19

## 2019-03-10 RX ADMIN — EMTRICITABINE AND TENOFOVIR DISOPROXIL FUMARATE 1 TABLET(S): 200; 300 TABLET, FILM COATED ORAL at 12:13

## 2019-03-10 RX ADMIN — ATORVASTATIN CALCIUM 40 MILLIGRAM(S): 80 TABLET, FILM COATED ORAL at 22:06

## 2019-03-10 RX ADMIN — Medication 20 MILLIEQUIVALENT(S): at 07:34

## 2019-03-10 RX ADMIN — INSULIN GLARGINE 10 UNIT(S): 100 INJECTION, SOLUTION SUBCUTANEOUS at 22:07

## 2019-03-10 RX ADMIN — Medication 2: at 22:06

## 2019-03-10 RX ADMIN — Medication 81 MILLIGRAM(S): at 12:13

## 2019-03-10 NOTE — PROGRESS NOTE ADULT - ASSESSMENT
72 yo M with AFib on Coumadin (last dose 3/5), HFrEF (EF 25%) s/p ICD placement, HTN, HLD, CAD s/p PCI and 3 stents 3 yrs ago was taken to an outpatient elective cath with Dr.Kim singh (diagnostic) prior to his VT ablation next week with  for ICD firing x2 in the past few months for sustained VT.     #NEUROLOGY   r/o CVA - despite CT head and CTA negative, suspicion for hypoperfusion in setting of borderline . Last known normal 3.30 PM, pt now back to baseline w/o any right sided deficit, still with minimal word finding difficulty. Pt not candidate for tPA because INR>1.5.   - Deficits resolved  - continue management per stroke: MAP goal reduced to 80s  - MRI head w/o contrast not possible as patient has ICD  - cw Lipitor 80,  - s/p ASA 81 and Plavix load 600mg  - repeat CT head today  - holding AC until after repeat CT    AMS - now resolved, possibly 2/2 Fentanyl (pt is opioid naive) and after Narcan pt returned to baseline.  - neuro check q1hr  - avoid opioids      #CARDIAC  HFrEF (EF 25%) with ICD in place. Patient was initially on phenylephrine, however in setting of low output this is not ideal as it would decrease CO even more given poor EF.  - Titrating Levophed down, maintaining MAP goal 80  - given goal is to keep BP higher now for neuroprotection would hold Entresto (home medication)  - pt euvolemic on exam, no need for diuresis  - avoid IVF given low EF  - Severely reduced EF and severe LV dilation on repeat ECHO    VT - s/p 2x shock in last few months  - As per Dr John patient to c/w Sotalol home dose 120mg q8    CAD s/p PCI  - continue home ASA  - Hold Plavix in the setting of elevatd INR    Atrial Fibrillation  - Coumadin held INR sub-therapeutic  - Plan to restart coumadin once negative CT head    #ENDO  DM - home on repaglinide and metformin  -MDSS and weight dosed insulin    HLD   -continue home Zetia and Lipitor    METABOLISM  F: none  E: replete PRN K<4, Mg<2  N: DASH/TLC      DISPO: MICU  CODE: Full 74 yo M with AFib on Coumadin (last dose 3/5), HFrEF (EF 25%) s/p ICD placement, HTN, HLD, CAD s/p PCI and 3 stents 3 yrs ago was taken to an outpatient elective cath with Dr.Kim singh (diagnostic) prior to his VT ablation next week with  for ICD firing x2 in the past few months for sustained VT.     #NEUROLOGY   r/o CVA - despite CT head and CTA negative, suspicion for hypoperfusion in setting of borderline . Last known normal 3.30 PM, pt now back to baseline w/o any right sided deficit, still with minimal word finding difficulty. Pt not candidate for tPA because INR>1.5.   - Deficits resolved  - continue management per stroke: MAP goal reduced to 80s  - MRI head w/o contrast not possible as patient has ICD  - cw Lipitor 80,  - s/p ASA 81 and Plavix load 600mg  - repeat CT head today  - holding AC until after repeat CT    AMS - now resolved, possibly 2/2 Fentanyl (pt is opioid naive) and after Narcan pt returned to baseline.  - neuro check q1hr  - avoid opioids      #CARDIAC  HFrEF (EF 25%) with ICD in place. Patient was initially on phenylephrine, however in setting of low output this is not ideal as it would decrease CO even more given poor EF.  - Titrating Levophed down, maintaining MAP goal 80  - given goal is to keep BP higher now for neuroprotection would hold Entresto (home medication)  - pt euvolemic on exam, no need for diuresis  - avoid IVF given low EF  - Severely reduced EF and severe LV dilation on repeat ECHO    VT - s/p 2x shock in last few months  - As per Dr John patient to c/w Sotalol home dose 120mg q8    CAD s/p PCI  - continue home ASA  - Hold Plavix in the setting of elevatd INR    Atrial Fibrillation  - Coumadin held INR sub-therapeutic  - Plan to restart coumadin once negative CT head    HLD   -continue home Zetia and Lipito    #ENDO  DM - home on repaglinide and metformin  -MDSS and weight dosed insulin    ID  - Patient on Truvada as home medicationc/w Truvada    METABOLISM  F: none  E: replete PRN K<4, Mg<2  N: DASH/TLC      DISPO: MICU  CODE: Full

## 2019-03-10 NOTE — PROGRESS NOTE ADULT - SUBJECTIVE AND OBJECTIVE BOX
neuro status appears normal and patient OOB  to get repeat CT almost 48 hours after "event"  Regardless of CT findings, it appears safe to stop the levo drip-  His baseline systolic BP is often in the 100mm range-not a problem  Hopefully, he can be stepped down to get him moving again and back on warfarin

## 2019-03-10 NOTE — PROGRESS NOTE ADULT - SUBJECTIVE AND OBJECTIVE BOX
INTERVAL HPI/OVERNIGHT EVENTS:    SUBJECTIVE: Patient seen and examined at bedside.    OBJECTIVE:    VITAL SIGNS:  ICU Vital Signs Last 24 Hrs  T(C): 31.3 (10 Mar 2019 06:26), Max: 37.1 (10 Mar 2019 01:20)  T(F): 88.3 (10 Mar 2019 06:26), Max: 98.7 (10 Mar 2019 01:20)  HR: 64 (10 Mar 2019 07:00) (46 - 72)  BP: 139/58 (10 Mar 2019 07:00) (96/48 - 146/63)  BP(mean): 82 (10 Mar 2019 07:00) (60 - 101)  ABP: --  ABP(mean): --  RR: 22 (10 Mar 2019 07:00) (12 - 39)  SpO2: 97% (10 Mar 2019 07:00) (93% - 97%)        03-09 @ 06:01  -  03-10 @ 07:00  --------------------------------------------------------  IN: 1120 mL / OUT: 1540 mL / NET: -420 mL      CAPILLARY BLOOD GLUCOSE  106 (08 Mar 2019 18:00)      POCT Blood Glucose.: 150 mg/dL (10 Mar 2019 06:14)      PHYSICAL EXAM:    General: WDWN ; NAD  HEENT: NC/AT; PERRL, anicteric sclera  Neck: supple, no JVD  Respiratory: CTA B/L; no W/R/R  Cardiovascular: +S1/S2; RRR; no M/R/G  Gastrointestinal: soft, NT/ND; +BS x4  Extremities: WWP; 2+ peripheral pulses B/L; no LE edema  Skin: normal color and turgor; no rash  Neurological:     MEDICATIONS:  MEDICATIONS  (STANDING):  aspirin  chewable 81 milliGRAM(s) Oral daily  atorvastatin 40 milliGRAM(s) Oral at bedtime  dextrose 5%. 1000 milliLiter(s) (50 mL/Hr) IV Continuous <Continuous>  dextrose 50% Injectable 12.5 Gram(s) IV Push Once  dextrose 50% Injectable 25 Gram(s) IV Push Once  dextrose 50% Injectable 25 Gram(s) IV Push Once  emtricitabine 200 mG/tenofovir 300 mG (TRUVADA) 1 Tablet(s) Oral daily  insulin glargine Injectable (LANTUS) 10 Unit(s) SubCutaneous at bedtime  insulin lispro (HumaLOG) corrective regimen sliding scale   SubCutaneous Before meals and at bedtime  norepinephrine Infusion 0.05 MICROgram(s)/kG/Min (8.344 mL/Hr) IV Continuous <Continuous>  sotalol 120 milliGRAM(s) Oral every 8 hours    MEDICATIONS  (PRN):  dextrose 40% Gel 15 Gram(s) Oral Once PRN Blood Glucose LESS THAN 70 milliGRAM(s)/deciliter  glucagon  Injectable 1 milliGRAM(s) IntraMuscular Once PRN Glucose LESS THAN 70 milligrams/deciliter      ALLERGIES:  Allergies    No Known Allergies    Intolerances        LABS:                        15.1   9.86  )-----------( 157      ( 10 Mar 2019 06:16 )             44.4     03-10    138  |  102  |  14  ----------------------------<  165<H>  3.9   |  22  |  0.62    Ca    9.0      10 Mar 2019 06:16  Mg     1.8     03-10    TPro  7.1  /  Alb  4.1  /  TBili  1.1  /  DBili  x   /  AST  19  /  ALT  16  /  AlkPhos  57  03-08    LIVER FUNCTIONS - ( 08 Mar 2019 17:49 )  Alb: 4.1 g/dL / Pro: 7.1 g/dL / ALK PHOS: 57 U/L / ALT: 16 U/L / AST: 19 U/L / GGT: x           PT/INR - ( 10 Mar 2019 06:16 )   PT: 14.7 sec;   INR: 1.29          PTT - ( 10 Mar 2019 06:16 )  PTT:28.7 sec    CARDIAC MARKERS ( 08 Mar 2019 10:17 )  x     / x     / 94 U/L / x     / 1.6 ng/mL        RADIOLOGY & ADDITIONAL TESTS: Reviewed. INTERVAL HPI/OVERNIGHT EVENTS:  Levophed, MAPs >80    SUBJECTIVE: Patient seen and examined at bedside. No complaints overnight, no nausea, vomiting, fever, SOB, abdominal pain, changes in mental status    OBJECTIVE:    VITAL SIGNS:  ICU Vital Signs Last 24 Hrs  T(C): 31.3 (10 Mar 2019 06:26), Max: 37.1 (10 Mar 2019 01:20)  T(F): 88.3 (10 Mar 2019 06:26), Max: 98.7 (10 Mar 2019 01:20)  HR: 64 (10 Mar 2019 07:00) (46 - 72)  BP: 139/58 (10 Mar 2019 07:00) (96/48 - 146/63)  BP(mean): 82 (10 Mar 2019 07:00) (60 - 101)  RR: 22 (10 Mar 2019 07:00) (12 - 39)  SpO2: 97% (10 Mar 2019 07:00) (93% - 97%)        03-09 @ 06:01  -  03-10 @ 07:00  --------------------------------------------------------  IN: 1120 mL / OUT: 1540 mL / NET: -420 mL      CAPILLARY BLOOD GLUCOSE  106 (08 Mar 2019 18:00)      POCT Blood Glucose.: 150 mg/dL (10 Mar 2019 06:14)      PHYSICAL EXAM:  General: NAD, comfortable sitting up in bed eating  HEENT: NCAT, PERRL, clear conjunctiva, no scleral icterus  Neck: supple, no JVD  Respiratory: CTA b/l, no wheezing, rhonchi, rales  Cardiovascular: RRR, normal S1S2, no M/R/G  Vascular: 2+ radial and DP pulses  Abdomen: soft, NT/ND, bowel sounds in all four quadrants, no palpable masses  Extremities: WWP, no clubbing, cyanosis, or edema  Skin: No rashes present  Neuro: A&Ox3, CN II-XII grossly intact, 5/5 strength throughout, sensation intact, gait deferred.      MEDICATIONS:  MEDICATIONS  (STANDING):  aspirin  chewable 81 milliGRAM(s) Oral daily  atorvastatin 40 milliGRAM(s) Oral at bedtime  dextrose 5%. 1000 milliLiter(s) (50 mL/Hr) IV Continuous <Continuous>  dextrose 50% Injectable 12.5 Gram(s) IV Push Once  dextrose 50% Injectable 25 Gram(s) IV Push Once  dextrose 50% Injectable 25 Gram(s) IV Push Once  emtricitabine 200 mG/tenofovir 300 mG (TRUVADA) 1 Tablet(s) Oral daily  insulin glargine Injectable (LANTUS) 10 Unit(s) SubCutaneous at bedtime  insulin lispro (HumaLOG) corrective regimen sliding scale   SubCutaneous Before meals and at bedtime  norepinephrine Infusion 0.05 MICROgram(s)/kG/Min (8.344 mL/Hr) IV Continuous <Continuous>  sotalol 120 milliGRAM(s) Oral every 8 hours    MEDICATIONS  (PRN):  dextrose 40% Gel 15 Gram(s) Oral Once PRN Blood Glucose LESS THAN 70 milliGRAM(s)/deciliter  glucagon  Injectable 1 milliGRAM(s) IntraMuscular Once PRN Glucose LESS THAN 70 milligrams/deciliter      ALLERGIES:  Allergies    No Known Allergies    Intolerances        LABS:                        15.1   9.86  )-----------( 157      ( 10 Mar 2019 06:16 )             44.4     03-10    138  |  102  |  14  ----------------------------<  165<H>  3.9   |  22  |  0.62    Ca    9.0      10 Mar 2019 06:16  Mg     1.8     03-10    TPro  7.1  /  Alb  4.1  /  TBili  1.1  /  DBili  x   /  AST  19  /  ALT  16  /  AlkPhos  57  03-08    LIVER FUNCTIONS - ( 08 Mar 2019 17:49 )  Alb: 4.1 g/dL / Pro: 7.1 g/dL / ALK PHOS: 57 U/L / ALT: 16 U/L / AST: 19 U/L / GGT: x           PT/INR - ( 10 Mar 2019 06:16 )   PT: 14.7 sec;   INR: 1.29          PTT - ( 10 Mar 2019 06:16 )  PTT:28.7 sec    CARDIAC MARKERS ( 08 Mar 2019 10:17 )  x     / x     / 94 U/L / x     / 1.6 ng/mL        RADIOLOGY & ADDITIONAL TESTS: Reviewed. INTERVAL HPI/OVERNIGHT EVENTS:  Levophed, MAPs >80    SUBJECTIVE: Patient seen and examined at bedside. No complaints overnight, no nausea, vomiting, fever, SOB, abdominal pain, changes in mental status    OBJECTIVE:    VITAL SIGNS:  ICU Vital Signs Last 24 Hrs  T(C): 31.3 (10 Mar 2019 06:26), Max: 37.1 (10 Mar 2019 01:20)  T(F): 88.3 (10 Mar 2019 06:26), Max: 98.7 (10 Mar 2019 01:20)  HR: 64 (10 Mar 2019 07:00) (46 - 72)  BP: 139/58 (10 Mar 2019 07:00) (96/48 - 146/63)  BP(mean): 82 (10 Mar 2019 07:00) (60 - 101)  RR: 22 (10 Mar 2019 07:00) (12 - 39)  SpO2: 97% (10 Mar 2019 07:00) (93% - 97%)        03-09 @ 06:01  -  03-10 @ 07:00  --------------------------------------------------------  IN: 1120 mL / OUT: 1540 mL / NET: -420 mL      CAPILLARY BLOOD GLUCOSE  106 (08 Mar 2019 18:00)      POCT Blood Glucose.: 150 mg/dL (10 Mar 2019 06:14)      PHYSICAL EXAM:  General: NAD, comfortable sitting up in bed eating  HEENT: NCAT, PERRL, clear conjunctiva, no scleral icterus  Neck: supple, no JVD  Respiratory: CTA b/l, no wheezing, rhonchi, rales  Cardiovascular: RRR, normal S1S2, no M/R/G  Vascular: 2+ radial and DP pulses  Abdomen: soft, NT/ND, bowel sounds in all four quadrants, no palpable masses  Extremities: WWP, no clubbing, cyanosis, or edema  Skin: No rashes present  Neuro: A&Ox3, CN II-XII grossly intact, 5/5 strength throughout, sensation intact, gait deferred.      MEDICATIONS:  MEDICATIONS  (STANDING):  aspirin  chewable 81 milliGRAM(s) Oral daily  atorvastatin 40 milliGRAM(s) Oral at bedtime  dextrose 5%. 1000 milliLiter(s) (50 mL/Hr) IV Continuous <Continuous>  dextrose 50% Injectable 12.5 Gram(s) IV Push Once  dextrose 50% Injectable 25 Gram(s) IV Push Once  dextrose 50% Injectable 25 Gram(s) IV Push Once  emtricitabine 200 mG/tenofovir 300 mG (TRUVADA) 1 Tablet(s) Oral daily  insulin glargine Injectable (LANTUS) 10 Unit(s) SubCutaneous at bedtime  insulin lispro (HumaLOG) corrective regimen sliding scale   SubCutaneous Before meals and at bedtime  norepinephrine Infusion 0.05 MICROgram(s)/kG/Min (8.344 mL/Hr) IV Continuous <Continuous>  sotalol 120 milliGRAM(s) Oral every 8 hours    MEDICATIONS  (PRN):  dextrose 40% Gel 15 Gram(s) Oral Once PRN Blood Glucose LESS THAN 70 milliGRAM(s)/deciliter  glucagon  Injectable 1 milliGRAM(s) IntraMuscular Once PRN Glucose LESS THAN 70 milligrams/deciliter      ALLERGIES:  Allergies    No Known Allergies    LABS:                        15.1   9.86  )-----------( 157      ( 10 Mar 2019 06:16 )             44.4     03-10    138  |  102  |  14  ----------------------------<  165<H>  3.9   |  22  |  0.62    Ca    9.0      10 Mar 2019 06:16  Mg     1.8     03-10    TPro  7.1  /  Alb  4.1  /  TBili  1.1  /  DBili  x   /  AST  19  /  ALT  16  /  AlkPhos  57  03-08    LIVER FUNCTIONS - ( 08 Mar 2019 17:49 )  Alb: 4.1 g/dL / Pro: 7.1 g/dL / ALK PHOS: 57 U/L / ALT: 16 U/L / AST: 19 U/L / GGT: x           PT/INR - ( 10 Mar 2019 06:16 )   PT: 14.7 sec;   INR: 1.29     PTT - ( 10 Mar 2019 06:16 )  PTT:28.7 sec    CARDIAC MARKERS ( 08 Mar 2019 10:17 )  x     / x     / 94 U/L / x     / 1.6 ng/mL        RADIOLOGY & ADDITIONAL TESTS: Reviewed.

## 2019-03-11 ENCOUNTER — APPOINTMENT (OUTPATIENT)
Dept: CT IMAGING | Facility: HOSPITAL | Age: 74
End: 2019-03-11

## 2019-03-11 LAB
ANION GAP SERPL CALC-SCNC: 10 MMOL/L — SIGNIFICANT CHANGE UP (ref 5–17)
APTT BLD: 66.7 SEC — HIGH (ref 27.5–36.3)
APTT BLD: 81.5 SEC — HIGH (ref 27.5–36.3)
APTT BLD: 83.7 SEC — HIGH (ref 27.5–36.3)
BUN SERPL-MCNC: 14 MG/DL — SIGNIFICANT CHANGE UP (ref 7–23)
CALCIUM SERPL-MCNC: 8.8 MG/DL — SIGNIFICANT CHANGE UP (ref 8.4–10.5)
CHLORIDE SERPL-SCNC: 101 MMOL/L — SIGNIFICANT CHANGE UP (ref 96–108)
CO2 SERPL-SCNC: 24 MMOL/L — SIGNIFICANT CHANGE UP (ref 22–31)
CREAT SERPL-MCNC: 0.66 MG/DL — SIGNIFICANT CHANGE UP (ref 0.5–1.3)
GLUCOSE BLDC GLUCOMTR-MCNC: 106 MG/DL — HIGH (ref 70–99)
GLUCOSE BLDC GLUCOMTR-MCNC: 126 MG/DL — HIGH (ref 70–99)
GLUCOSE BLDC GLUCOMTR-MCNC: 159 MG/DL — HIGH (ref 70–99)
GLUCOSE BLDC GLUCOMTR-MCNC: 200 MG/DL — HIGH (ref 70–99)
GLUCOSE SERPL-MCNC: 157 MG/DL — HIGH (ref 70–99)
HCT VFR BLD CALC: 43.1 % — SIGNIFICANT CHANGE UP (ref 39–50)
HGB BLD-MCNC: 14.7 G/DL — SIGNIFICANT CHANGE UP (ref 13–17)
INR BLD: 1.29 — HIGH (ref 0.88–1.16)
MAGNESIUM SERPL-MCNC: 1.9 MG/DL — SIGNIFICANT CHANGE UP (ref 1.6–2.6)
MCHC RBC-ENTMCNC: 33 PG — SIGNIFICANT CHANGE UP (ref 27–34)
MCHC RBC-ENTMCNC: 34.1 GM/DL — SIGNIFICANT CHANGE UP (ref 32–36)
MCV RBC AUTO: 96.6 FL — SIGNIFICANT CHANGE UP (ref 80–100)
NRBC # BLD: 0 /100 WBCS — SIGNIFICANT CHANGE UP (ref 0–0)
PHOSPHATE SERPL-MCNC: 2.9 MG/DL — SIGNIFICANT CHANGE UP (ref 2.5–4.5)
PLATELET # BLD AUTO: 139 K/UL — LOW (ref 150–400)
POTASSIUM SERPL-MCNC: 3.8 MMOL/L — SIGNIFICANT CHANGE UP (ref 3.5–5.3)
POTASSIUM SERPL-SCNC: 3.8 MMOL/L — SIGNIFICANT CHANGE UP (ref 3.5–5.3)
PROTHROM AB SERPL-ACNC: 14.7 SEC — HIGH (ref 10–12.9)
RBC # BLD: 4.46 M/UL — SIGNIFICANT CHANGE UP (ref 4.2–5.8)
RBC # FLD: 12.4 % — SIGNIFICANT CHANGE UP (ref 10.3–14.5)
SODIUM SERPL-SCNC: 135 MMOL/L — SIGNIFICANT CHANGE UP (ref 135–145)
WBC # BLD: 8.52 K/UL — SIGNIFICANT CHANGE UP (ref 3.8–10.5)
WBC # FLD AUTO: 8.52 K/UL — SIGNIFICANT CHANGE UP (ref 3.8–10.5)

## 2019-03-11 PROCEDURE — 99233 SBSQ HOSP IP/OBS HIGH 50: CPT

## 2019-03-11 RX ORDER — EMTRICITABINE AND TENOFOVIR DISOPROXIL FUMARATE 200; 300 MG/1; MG/1
1 TABLET, FILM COATED ORAL DAILY
Qty: 0 | Refills: 0 | Status: DISCONTINUED | OUTPATIENT
Start: 2019-03-12 | End: 2019-03-13

## 2019-03-11 RX ORDER — NOREPINEPHRINE BITARTRATE/D5W 8 MG/250ML
0.05 PLASTIC BAG, INJECTION (ML) INTRAVENOUS
Qty: 8 | Refills: 0 | Status: DISCONTINUED | OUTPATIENT
Start: 2019-03-11 | End: 2019-03-11

## 2019-03-11 RX ORDER — APIXABAN 2.5 MG/1
5 TABLET, FILM COATED ORAL EVERY 12 HOURS
Qty: 0 | Refills: 0 | Status: DISCONTINUED | OUTPATIENT
Start: 2019-03-11 | End: 2019-03-13

## 2019-03-11 RX ORDER — INSULIN GLARGINE 100 [IU]/ML
12 INJECTION, SOLUTION SUBCUTANEOUS AT BEDTIME
Qty: 0 | Refills: 0 | Status: DISCONTINUED | OUTPATIENT
Start: 2019-03-11 | End: 2019-03-13

## 2019-03-11 RX ORDER — INSULIN LISPRO 100/ML
3 VIAL (ML) SUBCUTANEOUS
Qty: 0 | Refills: 0 | Status: DISCONTINUED | OUTPATIENT
Start: 2019-03-11 | End: 2019-03-13

## 2019-03-11 RX ORDER — WARFARIN SODIUM 2.5 MG/1
5 TABLET ORAL ONCE
Qty: 0 | Refills: 0 | Status: DISCONTINUED | OUTPATIENT
Start: 2019-03-11 | End: 2019-03-11

## 2019-03-11 RX ORDER — INSULIN LISPRO 100/ML
2 VIAL (ML) SUBCUTANEOUS
Qty: 0 | Refills: 0 | Status: DISCONTINUED | OUTPATIENT
Start: 2019-03-11 | End: 2019-03-11

## 2019-03-11 RX ADMIN — APIXABAN 5 MILLIGRAM(S): 2.5 TABLET, FILM COATED ORAL at 11:52

## 2019-03-11 RX ADMIN — Medication 2: at 11:53

## 2019-03-11 RX ADMIN — Medication 120 MILLIGRAM(S): at 13:56

## 2019-03-11 RX ADMIN — INSULIN GLARGINE 12 UNIT(S): 100 INJECTION, SOLUTION SUBCUTANEOUS at 22:39

## 2019-03-11 RX ADMIN — Medication 10 MILLIGRAM(S): at 15:01

## 2019-03-11 RX ADMIN — Medication 2: at 06:54

## 2019-03-11 RX ADMIN — APIXABAN 5 MILLIGRAM(S): 2.5 TABLET, FILM COATED ORAL at 22:38

## 2019-03-11 RX ADMIN — Medication 8.34 MICROGRAM(S)/KG/MIN: at 00:10

## 2019-03-11 RX ADMIN — EMTRICITABINE AND TENOFOVIR DISOPROXIL FUMARATE 1 TABLET(S): 200; 300 TABLET, FILM COATED ORAL at 11:52

## 2019-03-11 RX ADMIN — Medication 3 UNIT(S): at 16:52

## 2019-03-11 RX ADMIN — ATORVASTATIN CALCIUM 40 MILLIGRAM(S): 80 TABLET, FILM COATED ORAL at 22:40

## 2019-03-11 RX ADMIN — Medication 120 MILLIGRAM(S): at 22:40

## 2019-03-11 RX ADMIN — Medication 81 MILLIGRAM(S): at 11:53

## 2019-03-11 NOTE — PROGRESS NOTE ADULT - SUBJECTIVE AND OBJECTIVE BOX
Neurology Stroke Progress Note    INTERVAL HPI/OVERNIGHT EVENTS:  73 year old male with PMH of Afib (on Coumadin last dose on 3/5/19), s/p ICD placement in [facility] in 2017, HTN, HLD, DM, CAD (s/p stent placement in 1994 in [facility], on Sotalol for VT suppression, was supposed to have a VT ablation performed on 3/11, however, pt developed slurred speech, R sided facial droop, R sided weakness and stroke code was called. CT head and CTA negative for acute hemorrhage, showed no evidence of infarction. tPA not given due to coagulopathy. Pt has been monitored in ICU since event.     Patient seen and examined at bedside. Dr. Lee spoke with patient's cardiologist and together decision was made to relax BP goals. Pt sitting in chair by bedside, patient has good understanding of current condition. Pt has no complaints.     MEDICATIONS  (STANDING):  apixaban 5 milliGRAM(s) Oral every 12 hours  aspirin  chewable 81 milliGRAM(s) Oral daily  atorvastatin 40 milliGRAM(s) Oral at bedtime  bisacodyl Suppository 10 milliGRAM(s) Rectal once  dextrose 5%. 1000 milliLiter(s) (50 mL/Hr) IV Continuous <Continuous>  dextrose 50% Injectable 12.5 Gram(s) IV Push Once  dextrose 50% Injectable 25 Gram(s) IV Push Once  dextrose 50% Injectable 25 Gram(s) IV Push Once  emtricitabine 200 mG/tenofovir 300 mG (TRUVADA) 1 Tablet(s) Oral daily  insulin glargine Injectable (LANTUS) 12 Unit(s) SubCutaneous at bedtime  insulin lispro (HumaLOG) corrective regimen sliding scale   SubCutaneous Before meals and at bedtime  insulin lispro Injectable (HumaLOG) 3 Unit(s) SubCutaneous three times a day before meals  sotalol 120 milliGRAM(s) Oral every 8 hours    MEDICATIONS  (PRN):  dextrose 40% Gel 15 Gram(s) Oral Once PRN Blood Glucose LESS THAN 70 milliGRAM(s)/deciliter  glucagon  Injectable 1 milliGRAM(s) IntraMuscular Once PRN Glucose LESS THAN 70 milligrams/deciliter      Allergies    No Known Allergies    Intolerances        ROS: As per HPI, otherwise negative    Vital Signs Last 24 Hrs  T(C): 36.4 (11 Mar 2019 12:00), Max: 36.6 (11 Mar 2019 02:00)  T(F): 97.6 (11 Mar 2019 12:00), Max: 97.9 (11 Mar 2019 02:00)  HR: 68 (11 Mar 2019 13:23) (44 - 76)  BP: 116/68 (11 Mar 2019 13:23) (95/59 - 145/73)  BP(mean): 87 (11 Mar 2019 13:23) (68 - 104)  RR: 24 (11 Mar 2019 13:23) (16 - 29)  SpO2: 97% (11 Mar 2019 13:23) (94% - 97%)    Physical exam:  General: awake and alert, sitting comfortably, no acute distress  CV: RRR  Neurologic:  Mental status: awake, alert, oriented to person, place, and time. Speech is fluent. Follows all commands. Attention/concentration intact. No dysarthria, no aphasia.  Cranial nerves:   II: visual fields are full to confrontation. pupils equally round and reactive to light,   III, IV, VI: EOMI without nystagmus  V:  V1-V3 sensation intact,   VII: no facial droop  VIII: hearing is intact  Motor: Normal bulk and tone, full strength throughout.  Sensation: intact to light touch. No neglect.   Coordination: deferred  Reflexes: deferred  Gait: deferred        LABS:                        14.7   8.52  )-----------( 139      ( 11 Mar 2019 06:04 )             43.1     03-11    135  |  101  |  14  ----------------------------<  157<H>  3.8   |  24  |  0.66    Ca    8.8      11 Mar 2019 06:04  Phos  2.9     03-11  Mg     1.9     03-11      PT/INR - ( 11 Mar 2019 06:04 )   PT: 14.7 sec;   INR: 1.29          PTT - ( 11 Mar 2019 10:37 )  PTT:81.5 sec    Lipid Profile in AM (03.09.19 @ 05:36)    Direct LDL: 35: LDL Cholesterol --- Interpretive Comment (for adults 18 and over)  Below 70                  Ideal for people at very high risk of heart  disease  Below 100                Ideal for people at risk of heart disease  100 - 129                  [place]  130 - 159                  Borderline high  160 - 189                  High  190 and Above        Very high mg/dL    Hemoglobin A1C, Whole Blood in AM (03.09.19 @ 05:38)    Hemoglobin A1C, Whole Blood: 7.2: Reference Range                 4.0-5.6%       High risk (prediabetic)        5.7-6.4%       Diabetic, diagnostic             >=6.5%       ADA diabetic treatment goal       <7.0%  Reference ranges are based upon the 2010 recommendations of the American  Diabetes Association.  Interpretation may vary for children and  adolescents. %      RADIOLOGY & ADDITIONAL TESTS:    < from: CT Brain Stroke Protocol (03.08.19 @ 15:52) >  IMPRESSION:   No acute intracranial hemorrhage, mass effect or CT evidence of recent   transcortical infarction at this time      < end of copied text >  < from: CT Angio Head w/ IV Cont (03.08.19 @ 16:05) >  IMPRESSION: No intracranial proximal arterial occlusion or high-grade   stenosis.      < end of copied text >  < from: CT Angio Head w/ IV Cont (03.08.19 @ 16:05) >  IMPRESSION:   No cervical or vertebral artery occlusion or high-grade stenosis is   identified in the neck.      < end of copied text >    < from: CT Head No Cont (03.10.19 @ 16:59) >  IMPRESSION:  EVOLVING ACUTE LEFT THALAMIC INFARCT.    < end of copied text >  Assessment and Plan  74 yo Male with pmhx of  HTN, HLD, DM, CAD, A fib, and V tach with acute onset R sided weakness and R facial droop, found on repeat head CT to have acute L thalamic infarct, likely secondary to hypoperfusion. Pt has been on pressors, however CVA now evolving, symptoms have resolved.       1)Secondary stroke prevention  -Pt should continue on ASA, can d/c warfarin and d/c heparin drip and start patient on Eliquis.   -Atorvastatin 80    2) Stroke risk factors  -LDL- LDL low in this patient.   -HTN- decrease the Levofed and bring patient to monitored unit, such as [address] [facility].   -HbA1C- pt with A1c of 7.2, will monitor with insulin and educate pt    3) Further workup   -speech therapy consult  DVT prophylaxis   -Heparin SQ TID and SCDs

## 2019-03-11 NOTE — PROGRESS NOTE ADULT - ATTENDING COMMENTS
Patient with left thalamic infarct c/w PCA embolic stroke   doing much better  will start eliquis. dc coumadin  dc pressors  cont asa 81 mg
Patient seen and examined.  Agree with above.  He feels fine now with improved speech output and better strength.  His sister agrees that his speech is back to baseline.  Some right sided weakness but much improved.    He still had some Levophed to maintain his blood pressure.  Repeat CT Head without contrast performed showed evolving left thalamic infarct.  No hemorrhage.  INR 1.2 since he's been off coumadin since admission.    His neurological exam has improved post Narcan and maintenance of cerebral perfusion via his blood pressure.    Plan:  1) Secondary stroke prevention -   a) Start heparin gtt - no bolus, 12 units/kg with PTT goal 50-70 with reassessment depending on his response.  Can consider starting coumadin 5mg tonight as well towards INR 2-3.  Note: He doesn't need antiplatelet from stroke standpoint as long as he's on anticoagulation  b) Continue Atorvastatin 40mg for LDL 35    2) Stroke risk factors -   a) Atrial fibrillation - anticoagulation and beta blocker  b) HTN - monitor his blood pressure with ability to slowly withdraw pressors with close monitoring of his neurological exam  c) Hyperlipidemia - see above  d) DM - hemoglobin A1C 7.2% - relatively controlled  e) CAD s/p MI - as per Cardiology    3) DVT prophylaxis - heparin drip and SCDs in place    4) Dispo - PT eval once stable blood pressure
mild expressive aphasia.  Taper pressor to keep MAP greater than 80

## 2019-03-11 NOTE — PROGRESS NOTE ADULT - SUBJECTIVE AND OBJECTIVE BOX
Hospital Course:  73 M PMH AFib (on Coumadin)), HFrEF (EF 25%) s/p ICD placement, HTN, HLD, CAD s/p PCI (s/p 3 HUMBLE) presented for elective diagnostic cardiac cathertization (3/7) with Dr Rashid prior to a VT ablation with Dr John scheduled the next week. VSS on arrival, patient underwent diagnostic cath, during the procedure received Fentanyl and Versed for sedation. A few hours after the procedure, the patient became confused, aphasic dysarthric with right sided hemiplegia. Stroke code was called (last known normal being 3.30PM), CT head, CTA both negative. Review by neurology attending: hypoperfusion in M2 and M3. SBP was 113-123 during the episode, tPA not given because INR>1.5. Patient was then given Narcan due to pinpoint pupils and started on pressors to keep MAP>90 due to hypoperfused stroke. Patient's right sided weakness resolved, however some expressive aphasia persisted. Patient was then transferred to the MICU on levophed gtt to maintain cerebral perfusion with MAP goal >80. Repeat CT show acute evolving thalamic stroke. Patient was started on hep gtt for AC transitioned to Eliquis and ASA.  Echo on 3/9 demonstrated severely dialated LV with severely reduced EF as well as akinesis of anteroseptum and anterior regions. Patient stable for step down to regional medical floor for continued management and medication optimization for secondary stroke prevention.    INTERVAL HPI/OVERNIGHT EVENTS: THANIA. BPs at goal, patient states that has good strength in the upper and lower extremities  denies sensational problems.   denies fevers, chills, nausea, or vomiting.   SUBJECTIVE: Patient seen and examined at bedside. No complaints overnight, no fever, nausea, vomiting, chest pain, SOB, changes in bowel movements.    OBJECTIVE:    ICU Vital Signs Last 24 Hrs  T(C): 36.8 (11 Mar 2019 17:30), Max: 36.8 (11 Mar 2019 17:30)  T(F): 98.2 (11 Mar 2019 17:30), Max: 98.2 (11 Mar 2019 17:30)  HR: 60 (11 Mar 2019 18:03) (44 - 76)  BP: 112/64 (11 Mar 2019 18:03) (102/75 - 145/73)  BP(mean): 83 (11 Mar 2019 18:03) (72 - 104)  ABP: --  ABP(mean): --  RR: 16 (11 Mar 2019 18:03) (16 - 29)  SpO2: 98% (11 Mar 2019 18:03) (94% - 98%)    CAPILLARY BLOOD GLUCOSE    POCT Blood Glucose.: 159 mg/dL (11 Mar 2019 05:54)    PHYSICAL EXAM:  General: NAD, comfortable sitting up in bed eating  HEENT: NCAT, PERRL, clear conjunctiva, no scleral icterus  Neck: supple, no JVD  Respiratory: CTA b/l, no wheezing, rhonchi, rales  Cardiovascular: RRR, normal S1S2, no M/R/G  Vascular: 2+ radial and DP pulses  Abdomen: soft, NT/ND, bowel sounds in all four quadrants, no palpable masses  Extremities: WWP, no clubbing, cyanosis, or edema  Skin: No rashes present  Neuro: A&Ox3, CN II-XII grossly intact, 5/5 strength throughout, sensation intact, gait deferred.     MEDICATIONS  (STANDING):  apixaban 5 milliGRAM(s) Oral every 12 hours  aspirin  chewable 81 milliGRAM(s) Oral daily  atorvastatin 40 milliGRAM(s) Oral at bedtime  dextrose 5%. 1000 milliLiter(s) (50 mL/Hr) IV Continuous <Continuous>  dextrose 50% Injectable 12.5 Gram(s) IV Push Once  dextrose 50% Injectable 25 Gram(s) IV Push Once  dextrose 50% Injectable 25 Gram(s) IV Push Once  insulin glargine Injectable (LANTUS) 12 Unit(s) SubCutaneous at bedtime  insulin lispro (HumaLOG) corrective regimen sliding scale   SubCutaneous Before meals and at bedtime  insulin lispro Injectable (HumaLOG) 3 Unit(s) SubCutaneous three times a day before meals  sotalol 120 milliGRAM(s) Oral every 8 hours    MEDICATIONS  (PRN):  dextrose 40% Gel 15 Gram(s) Oral Once PRN Blood Glucose LESS THAN 70 milliGRAM(s)/deciliter  glucagon  Injectable 1 milliGRAM(s) IntraMuscular Once PRN Glucose LESS THAN 70 milligrams/deciliter      ALLERGIES:  No Known Allergies  Intolerances      LABS:                        14.7   8.52  )-----------( 139      ( 11 Mar 2019 06:04 )             43.1     03-11    135  |  101  |  14  ----------------------------<  157<H>  3.8   |  24  |  0.66    Ca    8.8      11 Mar 2019 06:04  Phos  2.9     03-11  Mg     1.9     03-11        PT/INR - ( 11 Mar 2019 06:04 )   PT: 14.7 sec;   INR: 1.29          PTT - ( 11 Mar 2019 06:04 )  PTT:83.7 sec          RADIOLOGY & ADDITIONAL TESTS: Reviewed.

## 2019-03-11 NOTE — PROGRESS NOTE ADULT - SUBJECTIVE AND OBJECTIVE BOX
INTERVAL HPI/OVERNIGHT EVENTS:    SUBJECTIVE: Patient seen and examined at bedside.    OBJECTIVE:    VITAL SIGNS:  ICU Vital Signs Last 24 Hrs  T(C): 36.3 (11 Mar 2019 05:57), Max: 36.6 (11 Mar 2019 02:00)  T(F): 97.4 (11 Mar 2019 05:57), Max: 97.9 (11 Mar 2019 02:00)  HR: 50 (11 Mar 2019 08:00) (44 - 66)  BP: 141/71 (11 Mar 2019 08:00) (95/59 - 141/71)  BP(mean): 93 (11 Mar 2019 08:00) (60 - 104)  ABP: --  ABP(mean): --  RR: 25 (11 Mar 2019 08:00) (16 - 25)  SpO2: 97% (11 Mar 2019 08:00) (94% - 97%)        03-10 @ 07:01  -  03-11 @ 07:00  --------------------------------------------------------  IN: 853.3 mL / OUT: 1510 mL / NET: -656.7 mL    03-11 @ 07:01 - 03-11 @ 08:13  --------------------------------------------------------  IN: 23 mL / OUT: 0 mL / NET: 23 mL      CAPILLARY BLOOD GLUCOSE      POCT Blood Glucose.: 159 mg/dL (11 Mar 2019 05:54)      PHYSICAL EXAM:    General: WDWN ; NAD  HEENT: NC/AT; PERRL, anicteric sclera  Neck: supple, no JVD  Respiratory: CTA B/L; no W/R/R  Cardiovascular: +S1/S2; RRR; no M/R/G  Gastrointestinal: soft, NT/ND; +BS x4  Extremities: WWP; 2+ peripheral pulses B/L; no LE edema  Skin: normal color and turgor; no rash  Neurological:     MEDICATIONS:  MEDICATIONS  (STANDING):  aspirin  chewable 81 milliGRAM(s) Oral daily  atorvastatin 40 milliGRAM(s) Oral at bedtime  dextrose 5%. 1000 milliLiter(s) (50 mL/Hr) IV Continuous <Continuous>  dextrose 50% Injectable 12.5 Gram(s) IV Push Once  dextrose 50% Injectable 25 Gram(s) IV Push Once  dextrose 50% Injectable 25 Gram(s) IV Push Once  emtricitabine 200 mG/tenofovir 300 mG (TRUVADA) 1 Tablet(s) Oral daily  heparin  Infusion 1200 Unit(s)/Hr (12 mL/Hr) IV Continuous <Continuous>  insulin glargine Injectable (LANTUS) 10 Unit(s) SubCutaneous at bedtime  insulin lispro (HumaLOG) corrective regimen sliding scale   SubCutaneous Before meals and at bedtime  norepinephrine Infusion 0.05 MICROgram(s)/kG/Min (8.344 mL/Hr) IV Continuous <Continuous>  sotalol 120 milliGRAM(s) Oral every 8 hours    MEDICATIONS  (PRN):  dextrose 40% Gel 15 Gram(s) Oral Once PRN Blood Glucose LESS THAN 70 milliGRAM(s)/deciliter  glucagon  Injectable 1 milliGRAM(s) IntraMuscular Once PRN Glucose LESS THAN 70 milligrams/deciliter      ALLERGIES:  Allergies    No Known Allergies    Intolerances        LABS:                        14.7   8.52  )-----------( 139      ( 11 Mar 2019 06:04 )             43.1     03-11    135  |  101  |  14  ----------------------------<  157<H>  3.8   |  24  |  0.66    Ca    8.8      11 Mar 2019 06:04  Phos  2.9     03-11  Mg     1.9     03-11        PT/INR - ( 11 Mar 2019 06:04 )   PT: 14.7 sec;   INR: 1.29          PTT - ( 11 Mar 2019 06:04 )  PTT:83.7 sec          RADIOLOGY & ADDITIONAL TESTS: Reviewed. ************************  INCOMPLETE NOTE  ************************    INTERVAL HPI/OVERNIGHT EVENTS:    SUBJECTIVE: Patient seen and examined at bedside.    OBJECTIVE:    VITAL SIGNS:  ICU Vital Signs Last 24 Hrs  T(C): 36.3 (11 Mar 2019 05:57), Max: 36.6 (11 Mar 2019 02:00)  T(F): 97.4 (11 Mar 2019 05:57), Max: 97.9 (11 Mar 2019 02:00)  HR: 50 (11 Mar 2019 08:00) (44 - 66)  BP: 141/71 (11 Mar 2019 08:00) (95/59 - 141/71)  BP(mean): 93 (11 Mar 2019 08:00) (60 - 104)  ABP: --  ABP(mean): --  RR: 25 (11 Mar 2019 08:00) (16 - 25)  SpO2: 97% (11 Mar 2019 08:00) (94% - 97%)        03-10 @ 07:01  -  03-11 @ 07:00  --------------------------------------------------------  IN: 853.3 mL / OUT: 1510 mL / NET: -656.7 mL    03-11 @ 07:01  -  03-11 @ 08:13  --------------------------------------------------------  IN: 23 mL / OUT: 0 mL / NET: 23 mL      CAPILLARY BLOOD GLUCOSE      POCT Blood Glucose.: 159 mg/dL (11 Mar 2019 05:54)      PHYSICAL EXAM:  General: NAD, comfortable sitting up in bed eating  HEENT: NCAT, PERRL, clear conjunctiva, no scleral icterus  Neck: supple, no JVD  Respiratory: CTA b/l, no wheezing, rhonchi, rales  Cardiovascular: RRR, normal S1S2, no M/R/G  Vascular: 2+ radial and DP pulses  Abdomen: soft, NT/ND, bowel sounds in all four quadrants, no palpable masses  Extremities: WWP, no clubbing, cyanosis, or edema  Skin: No rashes present  Neuro: A&Ox3, CN II-XII grossly intact, 5/5 strength throughout, sensation intact, gait deferred.     MEDICATIONS:  MEDICATIONS  (STANDING):  aspirin  chewable 81 milliGRAM(s) Oral daily  atorvastatin 40 milliGRAM(s) Oral at bedtime  dextrose 5%. 1000 milliLiter(s) (50 mL/Hr) IV Continuous <Continuous>  dextrose 50% Injectable 12.5 Gram(s) IV Push Once  dextrose 50% Injectable 25 Gram(s) IV Push Once  dextrose 50% Injectable 25 Gram(s) IV Push Once  emtricitabine 200 mG/tenofovir 300 mG (TRUVADA) 1 Tablet(s) Oral daily  heparin  Infusion 1200 Unit(s)/Hr (12 mL/Hr) IV Continuous <Continuous>  insulin glargine Injectable (LANTUS) 10 Unit(s) SubCutaneous at bedtime  insulin lispro (HumaLOG) corrective regimen sliding scale   SubCutaneous Before meals and at bedtime  norepinephrine Infusion 0.05 MICROgram(s)/kG/Min (8.344 mL/Hr) IV Continuous <Continuous>  sotalol 120 milliGRAM(s) Oral every 8 hours    MEDICATIONS  (PRN):  dextrose 40% Gel 15 Gram(s) Oral Once PRN Blood Glucose LESS THAN 70 milliGRAM(s)/deciliter  glucagon  Injectable 1 milliGRAM(s) IntraMuscular Once PRN Glucose LESS THAN 70 milligrams/deciliter      ALLERGIES:    No Known Allergies    Intolerances        LABS:                        14.7   8.52  )-----------( 139      ( 11 Mar 2019 06:04 )             43.1     03-11    135  |  101  |  14  ----------------------------<  157<H>  3.8   |  24  |  0.66    Ca    8.8      11 Mar 2019 06:04  Phos  2.9     03-11  Mg     1.9     03-11        PT/INR - ( 11 Mar 2019 06:04 )   PT: 14.7 sec;   INR: 1.29          PTT - ( 11 Mar 2019 06:04 )  PTT:83.7 sec          RADIOLOGY & ADDITIONAL TESTS: Reviewed. **************************  MICU to 7L Transfer Note  **************************  Hospital Course:  73 M PMH AFib (on Coumadin)), HFrEF (EF 25%) s/p ICD placement, HTN, HLD, CAD s/p PCI (s/p 3 HUMBLE) presented for elective diagnostic cardiac cathertization (3/7) with Dr Rashid prior to a VT ablation with Dr John scheduled the next week. VSS on arrival, patient under           . Today post cath (he was given 50 Fentanyl and Versed for sedation) he was confused, aphasic dysarthric with right sided hemiplegia, stroke code was called (last known normal being 3.30PM), CT head, CTA both negative, however per neurology stroke attending hypoperfusion in M2 and M3 area noted. At the time his SBP was 113-123, tPA not given because INR>1.5. Patient was then given Narcan due to pinpoint pupils and started on pressors to keep MAP>90 due to hypoperfused stroke. Patient's right sided weakness resolved, however some expressive aphasia transferred to the MICU for continued management.  Echo on 3/9 demonstrated severely dialated LV with severely reduced EF as well as akinesis of anteroseptum and anterior regions.      INTERVAL HPI/OVERNIGHT EVENTS:    SUBJECTIVE: Patient seen and examined at bedside.    OBJECTIVE:    VITAL SIGNS:  ICU Vital Signs Last 24 Hrs  T(C): 36.3 (11 Mar 2019 05:57), Max: 36.6 (11 Mar 2019 02:00)  T(F): 97.4 (11 Mar 2019 05:57), Max: 97.9 (11 Mar 2019 02:00)  HR: 50 (11 Mar 2019 08:00) (44 - 66)  BP: 141/71 (11 Mar 2019 08:00) (95/59 - 141/71)  BP(mean): 93 (11 Mar 2019 08:00) (60 - 104)  ABP: --  ABP(mean): --  RR: 25 (11 Mar 2019 08:00) (16 - 25)  SpO2: 97% (11 Mar 2019 08:00) (94% - 97%)    03-10 @ 07:01  -  03-11 @ 07:00  --------------------------------------------------------  IN: 853.3 mL / OUT: 1510 mL / NET: -656.7 mL    03-11 @ 07:01 - 03-11 @ 08:13  --------------------------------------------------------  IN: 23 mL / OUT: 0 mL / NET: 23 mL      CAPILLARY BLOOD GLUCOSE      POCT Blood Glucose.: 159 mg/dL (11 Mar 2019 05:54)      PHYSICAL EXAM:  General: NAD, comfortable sitting up in bed eating  HEENT: NCAT, PERRL, clear conjunctiva, no scleral icterus  Neck: supple, no JVD  Respiratory: CTA b/l, no wheezing, rhonchi, rales  Cardiovascular: RRR, normal S1S2, no M/R/G  Vascular: 2+ radial and DP pulses  Abdomen: soft, NT/ND, bowel sounds in all four quadrants, no palpable masses  Extremities: WWP, no clubbing, cyanosis, or edema  Skin: No rashes present  Neuro: A&Ox3, CN II-XII grossly intact, 5/5 strength throughout, sensation intact, gait deferred.     MEDICATIONS:  MEDICATIONS  (STANDING):  aspirin  chewable 81 milliGRAM(s) Oral daily  atorvastatin 40 milliGRAM(s) Oral at bedtime  dextrose 5%. 1000 milliLiter(s) (50 mL/Hr) IV Continuous <Continuous>  dextrose 50% Injectable 12.5 Gram(s) IV Push Once  dextrose 50% Injectable 25 Gram(s) IV Push Once  dextrose 50% Injectable 25 Gram(s) IV Push Once  emtricitabine 200 mG/tenofovir 300 mG (TRUVADA) 1 Tablet(s) Oral daily  heparin  Infusion 1200 Unit(s)/Hr (12 mL/Hr) IV Continuous <Continuous>  insulin glargine Injectable (LANTUS) 10 Unit(s) SubCutaneous at bedtime  insulin lispro (HumaLOG) corrective regimen sliding scale   SubCutaneous Before meals and at bedtime  norepinephrine Infusion 0.05 MICROgram(s)/kG/Min (8.344 mL/Hr) IV Continuous <Continuous>  sotalol 120 milliGRAM(s) Oral every 8 hours    MEDICATIONS  (PRN):  dextrose 40% Gel 15 Gram(s) Oral Once PRN Blood Glucose LESS THAN 70 milliGRAM(s)/deciliter  glucagon  Injectable 1 milliGRAM(s) IntraMuscular Once PRN Glucose LESS THAN 70 milligrams/deciliter      ALLERGIES:    No Known Allergies    Intolerances        LABS:                        14.7   8.52  )-----------( 139      ( 11 Mar 2019 06:04 )             43.1     03-11    135  |  101  |  14  ----------------------------<  157<H>  3.8   |  24  |  0.66    Ca    8.8      11 Mar 2019 06:04  Phos  2.9     03-11  Mg     1.9     03-11        PT/INR - ( 11 Mar 2019 06:04 )   PT: 14.7 sec;   INR: 1.29          PTT - ( 11 Mar 2019 06:04 )  PTT:83.7 sec          RADIOLOGY & ADDITIONAL TESTS: Reviewed. **************************  MICU to 7L Transfer Note  **************************  Hospital Course:  73 M PMH AFib (on Coumadin)), HFrEF (EF 25%) s/p ICD placement, HTN, HLD, CAD s/p PCI (s/p 3 HUMBLE) presented for elective diagnostic cardiac cathertization (3/7) with Dr Rashid prior to a VT ablation with Dr John scheduled the next week. VSS on arrival, patient underwent diagnostic cath, during the procedure received Fentanyl and Versed for sedation. A few hours after the procedure, the patient became confused, aphasic dysarthric with right sided hemiplegia. Stroke code was called (last known normal being 3.30PM), CT head, CTA both negative. Review by neurology attending: hypoperfusion in M2 and M3. SBP was 113-123 during the episode, tPA not given because INR>1.5. Patient was then given Narcan due to pinpoint pupils and started on pressors to keep MAP>90 due to hypoperfused stroke. Patient's right sided weakness resolved, however some expressive aphasia persisted. Patient was then transferred to the MICU on levophed gtt to maintain cerebral perfusion with MAP goal >80. Repeat CT show acute evolving thalamic stroke. Patient was started on hep gtt for AC transitioned to Eliquis and ASA.  Echo on 3/9 demonstrated severely dialated LV with severely reduced EF as well as akinesis of anteroseptum and anterior regions. Patient stable for step down to regional medical floor for continued management and medication optimization for secondary stroke prevention.    INTERVAL HPI/OVERNIGHT EVENTS: THANIA. BPs at goal.    SUBJECTIVE: Patient seen and examined at bedside. No complaints overnight, no fever, nausea, vomiting, chest pain, SOB, changes in bowel movements.    OBJECTIVE:    VITAL SIGNS:  ICU Vital Signs Last 24 Hrs  T(C): 36.3 (11 Mar 2019 05:57), Max: 36.6 (11 Mar 2019 02:00)  T(F): 97.4 (11 Mar 2019 05:57), Max: 97.9 (11 Mar 2019 02:00)  HR: 50 (11 Mar 2019 08:00) (44 - 66)  BP: 141/71 (11 Mar 2019 08:00) (95/59 - 141/71)  BP(mean): 93 (11 Mar 2019 08:00) (60 - 104)  RR: 25 (11 Mar 2019 08:00) (16 - 25)  SpO2: 97% (11 Mar 2019 08:00) (94% - 97%)    03-10 @ 07:01 - 03-11 @ 07:00  --------------------------------------------------------  IN: 853.3 mL / OUT: 1510 mL / NET: -656.7 mL    03-11 @ 07:01 - 03-11 @ 08:13  --------------------------------------------------------  IN: 23 mL / OUT: 0 mL / NET: 23 mL      CAPILLARY BLOOD GLUCOSE      POCT Blood Glucose.: 159 mg/dL (11 Mar 2019 05:54)      PHYSICAL EXAM:  General: NAD, comfortable sitting up in bed eating  HEENT: NCAT, PERRL, clear conjunctiva, no scleral icterus  Neck: supple, no JVD  Respiratory: CTA b/l, no wheezing, rhonchi, rales  Cardiovascular: RRR, normal S1S2, no M/R/G  Vascular: 2+ radial and DP pulses  Abdomen: soft, NT/ND, bowel sounds in all four quadrants, no palpable masses  Extremities: WWP, no clubbing, cyanosis, or edema  Skin: No rashes present  Neuro: A&Ox3, CN II-XII grossly intact, 5/5 strength throughout, sensation intact, gait deferred.     MEDICATIONS:  MEDICATIONS  (STANDING):  aspirin  chewable 81 milliGRAM(s) Oral daily  atorvastatin 40 milliGRAM(s) Oral at bedtime  dextrose 5%. 1000 milliLiter(s) (50 mL/Hr) IV Continuous <Continuous>  dextrose 50% Injectable 12.5 Gram(s) IV Push Once  dextrose 50% Injectable 25 Gram(s) IV Push Once  dextrose 50% Injectable 25 Gram(s) IV Push Once  emtricitabine 200 mG/tenofovir 300 mG (TRUVADA) 1 Tablet(s) Oral daily  heparin  Infusion 1200 Unit(s)/Hr (12 mL/Hr) IV Continuous <Continuous>  insulin glargine Injectable (LANTUS) 10 Unit(s) SubCutaneous at bedtime  insulin lispro (HumaLOG) corrective regimen sliding scale   SubCutaneous Before meals and at bedtime  norepinephrine Infusion 0.05 MICROgram(s)/kG/Min (8.344 mL/Hr) IV Continuous <Continuous>  sotalol 120 milliGRAM(s) Oral every 8 hours    MEDICATIONS  (PRN):  dextrose 40% Gel 15 Gram(s) Oral Once PRN Blood Glucose LESS THAN 70 milliGRAM(s)/deciliter  glucagon  Injectable 1 milliGRAM(s) IntraMuscular Once PRN Glucose LESS THAN 70 milligrams/deciliter      ALLERGIES:    No Known Allergies    Intolerances        LABS:                        14.7   8.52  )-----------( 139      ( 11 Mar 2019 06:04 )             43.1     03-11    135  |  101  |  14  ----------------------------<  157<H>  3.8   |  24  |  0.66    Ca    8.8      11 Mar 2019 06:04  Phos  2.9     03-11  Mg     1.9     03-11        PT/INR - ( 11 Mar 2019 06:04 )   PT: 14.7 sec;   INR: 1.29          PTT - ( 11 Mar 2019 06:04 )  PTT:83.7 sec          RADIOLOGY & ADDITIONAL TESTS: Reviewed.

## 2019-03-11 NOTE — PROGRESS NOTE ADULT - ASSESSMENT
73M PMH AFib on Coumadin (last dose 3/5), HFrEF (EF 25%) s/p ICD placement, HTN, HLD, CAD s/p PCI and 3 stents 3 yrs ago was taken to an outpatient elective cath with Dr.Kim singh (diagnostic) prior to his VT ablation next week with  for ICD firing x2 in the past few months for sustained VT.      #Evolving L Thalamic Stroke   -Initial  CT head and CTA negative, repeat CT with L thalamic stroke. Started on Hep gtt for AC.  Last known normal 3.30 PM prior to coming, pt now back to baseline w/o any right sided deficit, still with minimal word finding difficulty. Pt not candidate for tPA because INR>1.5.   - Deficits resolved  - MRI head w/o contrast not possible as patient has ICD  - cw Lipitor 80  - Start ASA 81 mg   - Start Eliquis for secondary stroke prevention  - Repeat - CT head L thalamic stroke    # AMS - now resolved, possibly 2/2 Fentanyl (pt is opioid naive) and after Narcan pt returned to baseline.  - neuro check q4hr  - avoid opioids  - resolved    # chronic systolic HFrEF (EF 25%) with ICD in place. Patient was initially on phenylephrine, however in setting of low output this is not ideal as it would decrease CO even more given poor EF.  - off Levophed  - given goal is to keep BP higher now for neuroprotection would hold Entresto (home medication)  - pt euvolemic on exam, no need for diuresis  - avoid IVF given low EF  - Severely reduced EF and severe LV dilation on repeat ECHO  VT - s/p 2x shock in last few months  - As per Dr John patient to c/w Sotalol home dose 120mg q8  - Ablation to be performed as out-patient    # CAD s/p PCI  - continue home ASA   - On Eliquis - will hold off on Plavix  # Atrial Fibrillation  - AC with Eliquis  HLD   -continue home Zetia and Lipitor    #DM - home on repaglinide and metformin  -MDSS and weight dosed insulin    #HIV prophylaxis  - Patient on Truvada as home medication c/w Truvada    METABOLISM  F: none  E: replete PRN K<4, Mg<2  N: DASH/TLC  GI Ppx: none  DISPO: MICU to [facility]  CODE: Full

## 2019-03-11 NOTE — PROGRESS NOTE ADULT - ASSESSMENT
72 yo M with AFib on Coumadin (last dose 3/5), HFrEF (EF 25%) s/p ICD placement, HTN, HLD, CAD s/p PCI and 3 stents 3 yrs ago was taken to an outpatient elective cath with Dr.Kim singh (diagnostic) prior to his VT ablation next week with  for ICD firing x2 in the past few months for sustained VT.     #NEUROLOGY   Evolving L Thalamic Stroke  - Initial  CT head and CTA negative, repeat CT with L thalamic stroke. Started on Hep gtt for AC.  Last known normal 3.30 PM, pt now back to baseline w/o any right sided deficit, still with minimal word finding difficulty. Pt not candidate for tPA because INR>1.5.   - Deficits resolved  - continue management per stroke: MAP goal reduced to 80s  - MRI head w/o contrast not possible as patient has ICD  - cw Lipitor 80  - s/p ASA 81 and Plavix load 600mg  - repeat CT head L thalamic stroke  - On Hep gtt (Goal 50-70)  - Coumadin dosing tonight 5mg   - Coags in AM     AMS - now resolved, possibly 2/2 Fentanyl (pt is opioid naive) and after Narcan pt returned to baseline.  - neuro check q4hr  - avoid opioids      #CARDIAC  HFrEF (EF 25%) with ICD in place. Patient was initially on phenylephrine, however in setting of low output this is not ideal as it would decrease CO even more given poor EF.  - Titrating Levophed down, maintaining MAP goal 80  - given goal is to keep BP higher now for neuroprotection would hold Entresto (home medication)  - pt euvolemic on exam, no need for diuresis  - avoid IVF given low EF  - Severely reduced EF and severe LV dilation on repeat ECHO    VT - s/p 2x shock in last few months  - As per Dr John patient to c/w Sotalol home dose 120mg q8    CAD s/p PCI  - continue home ASA  - Hold Plavix in the setting of elevatd INR    Atrial Fibrillation  - Coumadin held INR sub-therapeutic  - Will dose coumadin for goal INR 2-3    HLD   -continue home Zetia and Lipitor    #ENDO  DM - home on repaglinide and metformin  -MDSS and weight dosed insulin    ID  - Patient on Truvada as home medicationc/w Truvada    METABOLISM  F: none  E: replete PRN K<4, Mg<2  N: DASH/TLC      DISPO: MICU  CODE: Full 73M PMH AFib on Coumadin (last dose 3/5), HFrEF (EF 25%) s/p ICD placement, HTN, HLD, CAD s/p PCI and 3 stents 3 yrs ago was taken to an outpatient elective cath with Dr.Kim singh (diagnostic) prior to his VT ablation next week with  for ICD firing x2 in the past few months for sustained VT.     #NEUROLOGY   Evolving L Thalamic Stroke  - Initial  CT head and CTA negative, repeat CT with L thalamic stroke. Started on Hep gtt for AC.  Last known normal 3.30 PM, pt now back to baseline w/o any right sided deficit, still with minimal word finding difficulty. Pt not candidate for tPA because INR>1.5.   - Deficits resolved  - MRI head w/o contrast not possible as patient has ICD  - cw Lipitor 80  - Start ASA 81 mg   - Start Eliquis for secondary stroke prevention  - Repeat - CT head L thalamic stroke    AMS - now resolved, possibly 2/2 Fentanyl (pt is opioid naive) and after Narcan pt returned to baseline.  - neuro check q4hr  - avoid opioids  - resolved      #CARDIAC  HFrEF (EF 25%) with ICD in place. Patient was initially on phenylephrine, however in setting of low output this is not ideal as it would decrease CO even more given poor EF.  - off Levophed  - given goal is to keep BP higher now for neuroprotection would hold Entresto (home medication)  - pt euvolemic on exam, no need for diuresis  - avoid IVF given low EF  - Severely reduced EF and severe LV dilation on repeat ECHO    VT - s/p 2x shock in last few months  - As per Dr John patient to c/w Sotalol home dose 120mg q8  - Ablation to be performed as out-patient    CAD s/p PCI  - continue home ASA   - On Eliquis - will hold off on Plavix    Atrial Fibrillation  - AC with Eliquis    HLD   -continue home Zetia and Lipitor    #ENDO  DM - home on repaglinide and metformin  -MDSS and weight dosed insulin    ID  - Patient on Truvada as home medicationc/w Truvada    METABOLISM  F: none  E: replete PRN K<4, Mg<2  N: DASH/TLC    GI Ppx: none    DISPO: MICU to [facility]  CODE: Full

## 2019-03-12 LAB
GLUCOSE BLDC GLUCOMTR-MCNC: 102 MG/DL — HIGH (ref 70–99)
GLUCOSE BLDC GLUCOMTR-MCNC: 117 MG/DL — HIGH (ref 70–99)
GLUCOSE BLDC GLUCOMTR-MCNC: 124 MG/DL — HIGH (ref 70–99)
GLUCOSE BLDC GLUCOMTR-MCNC: 132 MG/DL — HIGH (ref 70–99)
HCT VFR BLD CALC: 41.3 % — SIGNIFICANT CHANGE UP (ref 39–50)
HGB BLD-MCNC: 14.1 G/DL — SIGNIFICANT CHANGE UP (ref 13–17)
MCHC RBC-ENTMCNC: 32.7 PG — SIGNIFICANT CHANGE UP (ref 27–34)
MCHC RBC-ENTMCNC: 34.1 GM/DL — SIGNIFICANT CHANGE UP (ref 32–36)
MCV RBC AUTO: 95.8 FL — SIGNIFICANT CHANGE UP (ref 80–100)
NRBC # BLD: 0 /100 WBCS — SIGNIFICANT CHANGE UP (ref 0–0)
PLATELET # BLD AUTO: 110 K/UL — LOW (ref 150–400)
RBC # BLD: 4.31 M/UL — SIGNIFICANT CHANGE UP (ref 4.2–5.8)
RBC # FLD: 12.5 % — SIGNIFICANT CHANGE UP (ref 10.3–14.5)
WBC # BLD: 6.09 K/UL — SIGNIFICANT CHANGE UP (ref 3.8–10.5)
WBC # FLD AUTO: 6.09 K/UL — SIGNIFICANT CHANGE UP (ref 3.8–10.5)

## 2019-03-12 PROCEDURE — 99233 SBSQ HOSP IP/OBS HIGH 50: CPT

## 2019-03-12 RX ORDER — SACUBITRIL AND VALSARTAN 24; 26 MG/1; MG/1
1 TABLET, FILM COATED ORAL EVERY 12 HOURS
Qty: 0 | Refills: 0 | Status: DISCONTINUED | OUTPATIENT
Start: 2019-03-12 | End: 2019-03-13

## 2019-03-12 RX ADMIN — ATORVASTATIN CALCIUM 40 MILLIGRAM(S): 80 TABLET, FILM COATED ORAL at 22:09

## 2019-03-12 RX ADMIN — INSULIN GLARGINE 12 UNIT(S): 100 INJECTION, SOLUTION SUBCUTANEOUS at 22:09

## 2019-03-12 RX ADMIN — Medication 120 MILLIGRAM(S): at 06:52

## 2019-03-12 RX ADMIN — Medication 10 MILLIGRAM(S): at 22:09

## 2019-03-12 RX ADMIN — Medication 120 MILLIGRAM(S): at 15:18

## 2019-03-12 RX ADMIN — Medication 3 UNIT(S): at 17:12

## 2019-03-12 RX ADMIN — Medication 3 UNIT(S): at 07:44

## 2019-03-12 RX ADMIN — EMTRICITABINE AND TENOFOVIR DISOPROXIL FUMARATE 1 TABLET(S): 200; 300 TABLET, FILM COATED ORAL at 11:56

## 2019-03-12 RX ADMIN — Medication 120 MILLIGRAM(S): at 22:09

## 2019-03-12 RX ADMIN — Medication 81 MILLIGRAM(S): at 11:56

## 2019-03-12 RX ADMIN — SACUBITRIL AND VALSARTAN 1 TABLET(S): 24; 26 TABLET, FILM COATED ORAL at 15:18

## 2019-03-12 RX ADMIN — APIXABAN 5 MILLIGRAM(S): 2.5 TABLET, FILM COATED ORAL at 11:56

## 2019-03-12 RX ADMIN — APIXABAN 5 MILLIGRAM(S): 2.5 TABLET, FILM COATED ORAL at 22:09

## 2019-03-12 RX ADMIN — Medication 3 UNIT(S): at 11:55

## 2019-03-12 NOTE — PHYSICAL THERAPY INITIAL EVALUATION ADULT - DISCHARGE DISPOSITION, PT EVAL
no skilled PT needs/home/patient demonstrates independence with all mobility and is safe for discharge home at this time

## 2019-03-12 NOTE — DIETITIAN INITIAL EVALUATION ADULT. - OTHER INFO
74 yo/male with PMHx Afib on coumadin, HTN, HLD, DM, CAD, presented after 2 ICD shocks, planned for VT ablation. Upon admit, pt planned to go for [facility] prior to ablation. After cath, pt was confused, dysarthric, and had R. side hemiplegia. Stroke code called, showed hypoperfusion in M2/M3. tPA not given. Repeat CTH showing acute evolving stroke. Pt seen in room, awake, alert, pleasant. He endorses good appetite and intake at home PTA; diet recall consisting of berries, cheerios, fish, vegetables, chicken, etc. Pt reports usually being compliant with CHO diet and does not add salt to food. Compliant w/metformin as well. Currently w/100% intake at meals. NKFA. No N/V/C/D reported at this time. BM 3/12, after suppository given. He denies difficulty chewing or swallowing at this time. Skin intact pressure-wise. No pain endorsed. DM and DASH diet ed reviewed w/patient, receptive.

## 2019-03-12 NOTE — SPEECH LANGUAGE PATHOLOGY EVALUATION - SLP GENERAL OBSERVATIONS
Pt received awake, alert and oriented, and cooperative. He denies any change in communication ability.

## 2019-03-12 NOTE — PROGRESS NOTE ADULT - SUBJECTIVE AND OBJECTIVE BOX
looks good  OOB and ambulating without problem  BP at baseline  would like to re-start Entresto 49/51 BId while still here-as long as neuro  OK with this

## 2019-03-12 NOTE — PHYSICAL THERAPY INITIAL EVALUATION ADULT - GENERAL OBSERVATIONS, REHAB EVAL
Patient received seated out of bed no acute distress +telemetry, cleared for PT by RN Ignacia, agreeable to PT Evaluation. Left as found +call bell. FIM 7

## 2019-03-12 NOTE — PHYSICAL THERAPY INITIAL EVALUATION ADULT - PERTINENT HX OF CURRENT PROBLEM, REHAB EVAL
73 y o male with FMH of heart diseases with PMH of Afib (on Coumadin last dose on 3/5/19), s/p ICD placement in [facility] in 2017, HTN, HLD, DM, CAD (s/p stent placement in 1994 in [facility], pt was told to bring card) presented to his doctor Dora after ICD shocked him twice on November 25/18 and January 24/2019. Pt is on Sotalol for VT suppression. Pt was scheduled to go for VT ablation on Monday March 11, but prior to his ablation will undergo C due to abnormal CTA.

## 2019-03-12 NOTE — SPEECH LANGUAGE PATHOLOGY EVALUATION - SLP PRECAUTIONS/LIMITATIONS: HEARING
within functional limits
Breathing spontaneous and unlabored. Breath sounds clear and equal bilaterally with regular rhythm.

## 2019-03-12 NOTE — DIETITIAN INITIAL EVALUATION ADULT. - ENERGY NEEDS
Height 71"; .2#; #; 114%IBW  BMI 27.4  ABW used for calculations as pt between % of IBW. Needs estimated for maintenance in older adults; increased protein for s/p CVA

## 2019-03-12 NOTE — SPEECH LANGUAGE PATHOLOGY EVALUATION - SLP DIAGNOSIS
Pt presents with mild expressive language deficits characterized by rare anomia at the conversation level.

## 2019-03-12 NOTE — PHYSICAL THERAPY INITIAL EVALUATION ADULT - DIAGNOSIS, PT EVAL
5A: Primary prevention/risk reduction for loss of balance and falling 5D: Impaired motor function and sensory integrity associated with nonprogressive disorders of the central nervous system- acquired in adolescence or adulthood

## 2019-03-12 NOTE — SPEECH LANGUAGE PATHOLOGY EVALUATION - COMMENTS
Receptive language skills were clinically judged to be WFL at the simple and complex syntax level. Verbal expression tasks including generative naming, convergent naming, divergent naming, sentence completion, and object function tasks completed with 100% accuracy. Speech is fluent. However, word finding difficulties were seldomly observed at the conversation level. Pt completed all structured cognitive tasks without error. Skills are WFL. Not assessed WNL

## 2019-03-12 NOTE — PROGRESS NOTE ADULT - ASSESSMENT
73M PMH AFib on Coumadin (last dose 3/5), HFrEF (EF 25%) s/p ICD placement, HTN, HLD, CAD s/p PCI and 3 stents 3 yrs ago was taken to an outpatient elective cath with Dr.Kim singh (diagnostic) prior to his VT ablation next week with  for ICD firing x2 in the past few months for sustained VT.      #Evolving L Thalamic Stroke   -Initial  CT head and CTA negative, repeat CT with L thalamic stroke. Started on Hep gtt for AC.  Last known normal 3.30 PM prior to coming, pt now back to baseline w/o any right sided deficit, still with minimal word finding difficulty. Pt not candidate for tPA because INR>1.5.   - Deficits resolved  - MRI head w/o contrast not possible as patient has ICD  - cw Lipitor 80  - Start ASA 81 mg   - Start Eliquis for secondary stroke prevention      # AMS - now resolved, possibly 2/2 Fentanyl (pt is opioid naive) and after Narcan pt returned to baseline.  - neuro check q4hr  - avoid opioids  - resolved    # chronic systolic HFrEF (EF 25%) with ICD in place. Patient was initially on phenylephrine, however in setting of low output this is not ideal as it would decrease CO even more given poor EF.  - off Levophed  - able to resume entresto (home medication)  - pt euvolemic on exam, no need for diuresis  - avoid IVF given low EF  - Severely reduced EF and severe LV dilation on repeat ECHO  VT - s/p 2x shock in last few months  - As per Dr John patient to c/w Sotalol home dose 120mg q8  - Ablation to be performed as out-patient    # CAD s/p PCI  - continue home ASA   - On Eliquis - will hold off on Plavix  # Atrial Fibrillation  - AC with Eliquis  HLD   -continue home Zetia and Lipitor    #DM - home on repaglinide and metformin  -MDSS and weight dosed insulin    #HIV prophylaxis  - Patient on Truvada as home medication c/w Truvada    METABOLISM  F: none  E: replete PRN K<4, Mg<2  N: DASH/TLC  GI Ppx: none  DISPO: MICU to [address]  CODE: Full

## 2019-03-12 NOTE — PROGRESS NOTE ADULT - SUBJECTIVE AND OBJECTIVE BOX
INTERVAL HPI/OVERNIGHT EVENTS: THANIA. BPs at goal, patient states that has good strength in the upper and lower extremities  denies sensational problems.   denies fevers, chills, nausea, or vomiting.   SUBJECTIVE: Patient seen and examined at bedside. No complaints overnight, no fever, nausea, vomiting, chest pain, SOB, changes in bowel movements.    OBJECTIVE:    ICU Vital Signs Last 24 Hrs  T(C): 36.3 (12 Mar 2019 16:00), Max: 36.8 (11 Mar 2019 17:30)  T(F): 97.3 (12 Mar 2019 16:00), Max: 98.2 (11 Mar 2019 17:30)  HR: 70 (12 Mar 2019 15:35) (56 - 70)  BP: 135/77 (12 Mar 2019 15:35) (100/55 - 135/77)  BP(mean): 98 (12 Mar 2019 15:35) (73 - 98)  ABP: --  ABP(mean): --  RR: 16 (12 Mar 2019 08:51) (15 - 17)  SpO2: 97% (12 Mar 2019 15:35) (95% - 99%)      CAPILLARY BLOOD GLUCOSE    POCT Blood Glucose.: 159 mg/dL (11 Mar 2019 05:54)    PHYSICAL EXAM:  General: NAD, comfortable sitting up in bed eating  HEENT: NCAT, PERRL, clear conjunctiva, no scleral icterus  Neck: supple, no JVD  Respiratory: CTA b/l, no wheezing, rhonchi, rales  Cardiovascular: RRR, normal S1S2, no M/R/G  Vascular: 2+ radial and DP pulses  Abdomen: soft, NT/ND, bowel sounds in all four quadrants, no palpable masses  Extremities: WWP, no clubbing, cyanosis, or edema  Skin: No rashes present  Neuro: A&Ox3, CN II-XII grossly intact, 5/5 strength throughout, sensation intact, gait deferred.     MEDICATIONS  (STANDING):  apixaban 5 milliGRAM(s) Oral every 12 hours  aspirin  chewable 81 milliGRAM(s) Oral daily  atorvastatin 40 milliGRAM(s) Oral at bedtime  dextrose 5%. 1000 milliLiter(s) (50 mL/Hr) IV Continuous <Continuous>  dextrose 50% Injectable 12.5 Gram(s) IV Push Once  dextrose 50% Injectable 25 Gram(s) IV Push Once  dextrose 50% Injectable 25 Gram(s) IV Push Once  emtricitabine 200 mG/tenofovir 300 mG (TRUVADA) 1 Tablet(s) Oral daily  insulin glargine Injectable (LANTUS) 12 Unit(s) SubCutaneous at bedtime  insulin lispro (HumaLOG) corrective regimen sliding scale   SubCutaneous Before meals and at bedtime  insulin lispro Injectable (HumaLOG) 3 Unit(s) SubCutaneous three times a day before meals  sacubitril 49 mG/valsartan 51 mG 1 Tablet(s) Oral every 12 hours  sotalol 120 milliGRAM(s) Oral every 8 hours    MEDICATIONS  (PRN):  dextrose 40% Gel 15 Gram(s) Oral Once PRN Blood Glucose LESS THAN 70 milliGRAM(s)/deciliter  glucagon  Injectable 1 milliGRAM(s) IntraMuscular Once PRN Glucose LESS THAN 70 milligrams/deciliter        ALLERGIES:  No Known Allergies  Intolerances      .  LABS:                         14.1   6.09  )-----------( 110      ( 12 Mar 2019 06:02 )             41.3     03-11    135  |  101  |  14  ----------------------------<  157<H>  3.8   |  24  |  0.66    Ca    8.8      11 Mar 2019 06:04  Phos  2.9     03-11  Mg     1.9     03-11      PT/INR - ( 11 Mar 2019 06:04 )   PT: 14.7 sec;   INR: 1.29          PTT - ( 11 Mar 2019 10:37 )  PTT:81.5 sec

## 2019-03-13 ENCOUNTER — TRANSCRIPTION ENCOUNTER (OUTPATIENT)
Age: 74
End: 2019-03-13

## 2019-03-13 VITALS — TEMPERATURE: 97 F

## 2019-03-13 LAB
ANION GAP SERPL CALC-SCNC: 9 MMOL/L — SIGNIFICANT CHANGE UP (ref 5–17)
BUN SERPL-MCNC: 13 MG/DL — SIGNIFICANT CHANGE UP (ref 7–23)
CALCIUM SERPL-MCNC: 9.1 MG/DL — SIGNIFICANT CHANGE UP (ref 8.4–10.5)
CHLORIDE SERPL-SCNC: 102 MMOL/L — SIGNIFICANT CHANGE UP (ref 96–108)
CO2 SERPL-SCNC: 27 MMOL/L — SIGNIFICANT CHANGE UP (ref 22–31)
CREAT SERPL-MCNC: 0.68 MG/DL — SIGNIFICANT CHANGE UP (ref 0.5–1.3)
GLUCOSE BLDC GLUCOMTR-MCNC: 164 MG/DL — HIGH (ref 70–99)
GLUCOSE BLDC GLUCOMTR-MCNC: 97 MG/DL — SIGNIFICANT CHANGE UP (ref 70–99)
GLUCOSE SERPL-MCNC: 102 MG/DL — HIGH (ref 70–99)
HCT VFR BLD CALC: 44.9 % — SIGNIFICANT CHANGE UP (ref 39–50)
HGB BLD-MCNC: 15.3 G/DL — SIGNIFICANT CHANGE UP (ref 13–17)
MAGNESIUM SERPL-MCNC: 1.9 MG/DL — SIGNIFICANT CHANGE UP (ref 1.6–2.6)
MCHC RBC-ENTMCNC: 33 PG — SIGNIFICANT CHANGE UP (ref 27–34)
MCHC RBC-ENTMCNC: 34.1 GM/DL — SIGNIFICANT CHANGE UP (ref 32–36)
MCV RBC AUTO: 97 FL — SIGNIFICANT CHANGE UP (ref 80–100)
NRBC # BLD: 0 /100 WBCS — SIGNIFICANT CHANGE UP (ref 0–0)
PLATELET # BLD AUTO: 132 K/UL — LOW (ref 150–400)
POTASSIUM SERPL-MCNC: 4.2 MMOL/L — SIGNIFICANT CHANGE UP (ref 3.5–5.3)
POTASSIUM SERPL-SCNC: 4.2 MMOL/L — SIGNIFICANT CHANGE UP (ref 3.5–5.3)
RBC # BLD: 4.63 M/UL — SIGNIFICANT CHANGE UP (ref 4.2–5.8)
RBC # FLD: 12.6 % — SIGNIFICANT CHANGE UP (ref 10.3–14.5)
SODIUM SERPL-SCNC: 138 MMOL/L — SIGNIFICANT CHANGE UP (ref 135–145)
WBC # BLD: 9.06 K/UL — SIGNIFICANT CHANGE UP (ref 3.8–10.5)
WBC # FLD AUTO: 9.06 K/UL — SIGNIFICANT CHANGE UP (ref 3.8–10.5)

## 2019-03-13 PROCEDURE — 82550 ASSAY OF CK (CPK): CPT

## 2019-03-13 PROCEDURE — 82553 CREATINE MB FRACTION: CPT

## 2019-03-13 PROCEDURE — 85610 PROTHROMBIN TIME: CPT

## 2019-03-13 PROCEDURE — C1769: CPT

## 2019-03-13 PROCEDURE — 97161 PT EVAL LOW COMPLEX 20 MIN: CPT

## 2019-03-13 PROCEDURE — 85025 COMPLETE CBC W/AUTO DIFF WBC: CPT

## 2019-03-13 PROCEDURE — C1887: CPT

## 2019-03-13 PROCEDURE — 83036 HEMOGLOBIN GLYCOSYLATED A1C: CPT

## 2019-03-13 PROCEDURE — 85730 THROMBOPLASTIN TIME PARTIAL: CPT

## 2019-03-13 PROCEDURE — 85027 COMPLETE CBC AUTOMATED: CPT

## 2019-03-13 PROCEDURE — 92523 SPEECH SOUND LANG COMPREHEN: CPT

## 2019-03-13 PROCEDURE — 86900 BLOOD TYPING SEROLOGIC ABO: CPT

## 2019-03-13 PROCEDURE — 80048 BASIC METABOLIC PNL TOTAL CA: CPT

## 2019-03-13 PROCEDURE — 86850 RBC ANTIBODY SCREEN: CPT

## 2019-03-13 PROCEDURE — 99238 HOSP IP/OBS DSCHRG MGMT 30/<: CPT

## 2019-03-13 PROCEDURE — 70450 CT HEAD/BRAIN W/O DYE: CPT

## 2019-03-13 PROCEDURE — 82962 GLUCOSE BLOOD TEST: CPT

## 2019-03-13 PROCEDURE — 84443 ASSAY THYROID STIM HORMONE: CPT

## 2019-03-13 PROCEDURE — 84100 ASSAY OF PHOSPHORUS: CPT

## 2019-03-13 PROCEDURE — 86901 BLOOD TYPING SEROLOGIC RH(D): CPT

## 2019-03-13 PROCEDURE — 36415 COLL VENOUS BLD VENIPUNCTURE: CPT

## 2019-03-13 PROCEDURE — 83735 ASSAY OF MAGNESIUM: CPT

## 2019-03-13 PROCEDURE — 80061 LIPID PANEL: CPT

## 2019-03-13 PROCEDURE — 93321 DOPPLER ECHO F-UP/LMTD STD: CPT

## 2019-03-13 PROCEDURE — C1894: CPT

## 2019-03-13 PROCEDURE — 80053 COMPREHEN METABOLIC PANEL: CPT

## 2019-03-13 PROCEDURE — 70498 CT ANGIOGRAPHY NECK: CPT

## 2019-03-13 PROCEDURE — 93005 ELECTROCARDIOGRAM TRACING: CPT

## 2019-03-13 PROCEDURE — 70496 CT ANGIOGRAPHY HEAD: CPT

## 2019-03-13 RX ORDER — ATORVASTATIN CALCIUM 80 MG/1
1 TABLET, FILM COATED ORAL
Qty: 0 | Refills: 0 | COMMUNITY

## 2019-03-13 RX ORDER — WARFARIN SODIUM 2.5 MG/1
1 TABLET ORAL
Qty: 0 | Refills: 0 | COMMUNITY

## 2019-03-13 RX ORDER — ATORVASTATIN CALCIUM 80 MG/1
1 TABLET, FILM COATED ORAL
Qty: 30 | Refills: 0 | OUTPATIENT
Start: 2019-03-13 | End: 2019-04-11

## 2019-03-13 RX ORDER — MAGNESIUM SULFATE 500 MG/ML
1 VIAL (ML) INJECTION ONCE
Qty: 0 | Refills: 0 | Status: COMPLETED | OUTPATIENT
Start: 2019-03-13 | End: 2019-03-13

## 2019-03-13 RX ORDER — APIXABAN 2.5 MG/1
1 TABLET, FILM COATED ORAL
Qty: 60 | Refills: 0 | OUTPATIENT
Start: 2019-03-13 | End: 2019-04-11

## 2019-03-13 RX ORDER — ASPIRIN/CALCIUM CARB/MAGNESIUM 324 MG
1 TABLET ORAL
Qty: 30 | Refills: 0 | OUTPATIENT
Start: 2019-03-13 | End: 2019-04-11

## 2019-03-13 RX ADMIN — Medication 200 GRAM(S): at 10:00

## 2019-03-13 RX ADMIN — APIXABAN 5 MILLIGRAM(S): 2.5 TABLET, FILM COATED ORAL at 12:07

## 2019-03-13 RX ADMIN — Medication 2: at 11:00

## 2019-03-13 RX ADMIN — Medication 3 UNIT(S): at 07:32

## 2019-03-13 RX ADMIN — Medication 81 MILLIGRAM(S): at 12:07

## 2019-03-13 RX ADMIN — EMTRICITABINE AND TENOFOVIR DISOPROXIL FUMARATE 1 TABLET(S): 200; 300 TABLET, FILM COATED ORAL at 12:08

## 2019-03-13 RX ADMIN — Medication 120 MILLIGRAM(S): at 07:32

## 2019-03-13 RX ADMIN — Medication 3 UNIT(S): at 11:00

## 2019-03-13 RX ADMIN — SACUBITRIL AND VALSARTAN 1 TABLET(S): 24; 26 TABLET, FILM COATED ORAL at 07:32

## 2019-03-13 NOTE — DISCHARGE NOTE PROVIDER - CARE PROVIDER_API CALL
Daniella Lee)  Neurology; Vascular Neurology  [address], [address] [place] [address]  Phone: [phone]  Fax: [phone]  Follow Up Time:     Steve Pichardo)  Cardiovascular Disease; Internal Medicine  [address] [address]  Phone: [phone]  Fax: [phone]  Follow Up Time:

## 2019-03-13 NOTE — DISCHARGE NOTE PROVIDER - CARE PROVIDERS DIRECT ADDRESSES
[email or web address]

## 2019-03-13 NOTE — DISCHARGE NOTE PROVIDER - NSDCCPCAREPLAN_GEN_ALL_CORE_FT
PRINCIPAL DISCHARGE DIAGNOSIS  Problem: Stroke  Assessment and Plan of Treatment: -You came to the hospital due to new onset stroke. You had MRI of your head that showed stroke on left middle cerebral artery. This affected your language. You were given tpa, to help break this clot  Continue aspirin 81 mg daily, atorvastatin 40 mg daily,   -You will follow up with Dr. Lee, your neurologist.  -follow up with speech therapy as well.         SECONDARY DISCHARGE DIAGNOSES  Problem: Atrial fibrillation  Assessment and Plan of Treatment: -please take eliquis 5 mg once in the morning and once in the evening. Follow your electrophysiologist on an outpatient basis.    Problem: Hypertension  Assessment and Plan of Treatment: -continue your home sotalol and entresto, follow up with your home cardiologist    Problem: Mixed hyperlipidemia  Assessment and Plan of Treatment: -Continue management as above    Problem: Chronic diastolic congestive heart failure  Assessment and Plan of Treatment: -you have a history of congestive heart failure  -please limit fluids to 1 liter or less per day  -please limit salt to 2,000 mg or less per day  -continue management as above please follow up with your cardiologist.   -you have an ICD in place, please follow up with electrophysiologist.

## 2019-03-13 NOTE — DISCHARGE NOTE PROVIDER - HOSPITAL COURSE
73 M PMH AFib (on Coumadin)), HFrEF (EF 25%) s/p ICD placement, HTN, HLD, CAD s/p PCI (s/p 3 HUMBLE) presented for elective diagnostic cardiac cathertization (3/7) with Dr Rashid prior to a VT ablation with Dr John scheduled the next week. VSS on arrival, patient underwent diagnostic cath, during the procedure received Fentanyl and Versed for sedation. A few hours after the procedure, the patient became confused, aphasic dysarthric with right sided hemiplegia. Stroke code was called (last known normal being 3.30PM), CT head, CTA both negative. Review by neurology attending: hypoperfusion in M2 and M3. SBP was 113-123 during the episode, tPA not given because INR>1.5. Patient was then given Narcan due to pinpoint pupils and started on pressors to keep MAP>90 due to hypoperfused stroke. Patient's right sided weakness resolved, however some expressive aphasia persisted. Patient was then transferred to the MICU on levophed gtt to maintain cerebral perfusion with MAP goal >80. Repeat CT show acute evolving thalamic stroke. Patient was started on hep gtt for AC transitioned to Eliquis and ASA.  Echo on 3/9 demonstrated severely dialated LV with severely reduced EF as well as akinesis of anteroseptum and anterior regions.

## 2019-03-13 NOTE — DISCHARGE NOTE PROVIDER - NSDCFUADDINST_GEN_ALL_CORE_FT
Please no driving and stay well rested during the day until you follow up with Dr. Lee. Be careful with decision making. Please do not travel until you follow up with Dr. Lee on an outpatient basis. Stay well rested during the day until you follow up with Dr. Lee. Be careful with decision making. Please do not travel until you follow up with Dr. Lee on an outpatient basis.

## 2019-03-13 NOTE — DISCHARGE NOTE NURSING/CASE MANAGEMENT/SOCIAL WORK - NSDCDPATPORTLINK_GEN_ALL_CORE
You can access the [facility] Patient Portal, offered by [facility], by registering with the following website: [email or web address]

## 2019-03-13 NOTE — DISCHARGE NOTE PROVIDER - NSDCFUADDAPPT_GEN_ALL_CORE_FT
Follow up with Dr. Pichardo your cardiologist  Follow up with Dr. Lee March 28th at 11 am.   Follow up with speech pathology Tuesday ch 21st at 10 am at [address] on the 10th floor. [phone]

## 2019-03-13 NOTE — DISCHARGE NOTE NURSING/CASE MANAGEMENT/SOCIAL WORK - NSDCFUADDAPPT_GEN_ALL_CORE_FT
Follow up with Dr. Pichardo your cardiologist  Follow up with Dr. Lee March 28th at 11 am.   Follow up with speech pathology Tuesday ch 21st at 10 am at [address] on the 10th floor. [phone]

## 2019-03-13 NOTE — DISCHARGE NOTE NURSING/CASE MANAGEMENT/SOCIAL WORK - NSDCPEPTSTRK_GEN_ALL_CORE
Call 911 for stroke/Stroke support groups for patients, families, and friends/Stroke warning signs and symptoms/Signs and symptoms of stroke/Stroke education booklet/Prescribed medications/Risk factors for stroke/Need for follow up after discharge

## 2019-03-14 ENCOUNTER — INBOUND DOCUMENT (OUTPATIENT)
Age: 74
End: 2019-03-14

## 2019-03-19 DIAGNOSIS — E11.9 TYPE 2 DIABETES MELLITUS WITHOUT COMPLICATIONS: ICD-10-CM

## 2019-03-19 DIAGNOSIS — I11.0 HYPERTENSIVE HEART DISEASE WITH HEART FAILURE: ICD-10-CM

## 2019-03-19 DIAGNOSIS — R41.82 ALTERED MENTAL STATUS, UNSPECIFIED: ICD-10-CM

## 2019-03-19 DIAGNOSIS — I50.22 CHRONIC SYSTOLIC (CONGESTIVE) HEART FAILURE: ICD-10-CM

## 2019-03-19 DIAGNOSIS — I47.2 VENTRICULAR TACHYCARDIA: ICD-10-CM

## 2019-03-19 DIAGNOSIS — I25.2 OLD MYOCARDIAL INFARCTION: ICD-10-CM

## 2019-03-19 DIAGNOSIS — Z79.84 LONG TERM (CURRENT) USE OF ORAL HYPOGLYCEMIC DRUGS: ICD-10-CM

## 2019-03-19 DIAGNOSIS — I63.9 CEREBRAL INFARCTION, UNSPECIFIED: ICD-10-CM

## 2019-03-19 DIAGNOSIS — T40.4X5A ADVERSE EFFECT OF OTHER SYNTHETIC NARCOTICS, INITIAL ENCOUNTER: ICD-10-CM

## 2019-03-19 DIAGNOSIS — R47.01 APHASIA: ICD-10-CM

## 2019-03-19 DIAGNOSIS — G81.91 HEMIPLEGIA, UNSPECIFIED AFFECTING RIGHT DOMINANT SIDE: ICD-10-CM

## 2019-03-19 DIAGNOSIS — I25.5 ISCHEMIC CARDIOMYOPATHY: ICD-10-CM

## 2019-03-19 DIAGNOSIS — Z79.01 LONG TERM (CURRENT) USE OF ANTICOAGULANTS: ICD-10-CM

## 2019-03-19 DIAGNOSIS — E78.5 HYPERLIPIDEMIA, UNSPECIFIED: ICD-10-CM

## 2019-03-19 DIAGNOSIS — Z95.5 PRESENCE OF CORONARY ANGIOPLASTY IMPLANT AND GRAFT: ICD-10-CM

## 2019-03-19 DIAGNOSIS — I48.91 UNSPECIFIED ATRIAL FIBRILLATION: ICD-10-CM

## 2019-03-19 DIAGNOSIS — Z95.810 PRESENCE OF AUTOMATIC (IMPLANTABLE) CARDIAC DEFIBRILLATOR: ICD-10-CM

## 2019-03-19 DIAGNOSIS — R47.1 DYSARTHRIA AND ANARTHRIA: ICD-10-CM

## 2019-03-19 DIAGNOSIS — I25.10 ATHEROSCLEROTIC HEART DISEASE OF NATIVE CORONARY ARTERY WITHOUT ANGINA PECTORIS: ICD-10-CM

## 2019-03-21 ENCOUNTER — APPOINTMENT (OUTPATIENT)
Dept: OTOLARYNGOLOGY | Facility: CLINIC | Age: 74
End: 2019-03-21

## 2019-03-26 ENCOUNTER — APPOINTMENT (OUTPATIENT)
Dept: OTOLARYNGOLOGY | Facility: CLINIC | Age: 74
End: 2019-03-26
Payer: MEDICARE

## 2019-03-26 PROCEDURE — 92523 SPEECH SOUND LANG COMPREHEN: CPT | Mod: GN

## 2019-03-28 ENCOUNTER — RX RENEWAL (OUTPATIENT)
Age: 74
End: 2019-03-28

## 2019-03-28 ENCOUNTER — APPOINTMENT (OUTPATIENT)
Dept: HEART AND VASCULAR | Facility: CLINIC | Age: 74
End: 2019-03-28
Payer: MEDICARE

## 2019-03-28 ENCOUNTER — APPOINTMENT (OUTPATIENT)
Dept: NEUROLOGY | Facility: CLINIC | Age: 74
End: 2019-03-28
Payer: MEDICARE

## 2019-03-28 VITALS
HEIGHT: 71 IN | BODY MASS INDEX: 26.6 KG/M2 | OXYGEN SATURATION: 98 % | DIASTOLIC BLOOD PRESSURE: 69 MMHG | WEIGHT: 190 LBS | HEART RATE: 60 BPM | SYSTOLIC BLOOD PRESSURE: 108 MMHG

## 2019-03-28 VITALS
SYSTOLIC BLOOD PRESSURE: 116 MMHG | BODY MASS INDEX: 26.74 KG/M2 | HEIGHT: 71 IN | HEART RATE: 62 BPM | DIASTOLIC BLOOD PRESSURE: 65 MMHG | WEIGHT: 191 LBS

## 2019-03-28 DIAGNOSIS — Z78.9 OTHER SPECIFIED HEALTH STATUS: ICD-10-CM

## 2019-03-28 PROCEDURE — 99214 OFFICE O/P EST MOD 30 MIN: CPT

## 2019-03-28 PROCEDURE — 93282 PRGRMG EVAL IMPLANTABLE DFB: CPT

## 2019-03-28 PROCEDURE — 99213 OFFICE O/P EST LOW 20 MIN: CPT | Mod: 25

## 2019-03-28 PROCEDURE — 93290 INTERROG DEV EVAL ICPMS IP: CPT | Mod: 26

## 2019-03-28 NOTE — PROCEDURE
[No] : not [NSR] : normal sinus rhythm [ICD] : Implantable cardioverter-defibrillator [VVI] : VVI [Charge Time: ___ sec] : charge time was [unfilled] seconds [Longevity: ___ months] : The estimated remaining battery life is [unfilled] months [Normal] : The battery status is normal. [Threshold Testing Performed] : Threshold testing was performed [Lead Imp:  ___ohms] : lead impedance was [unfilled] ohms [Sensing Amplitude ___mv] : sensing amplitude was [unfilled] mv [___V @] : [unfilled] V [___ ms] : [unfilled] ms [None] : none [Sense ___ %] : Sense [unfilled]% [de-identified] : Medtronic [de-identified] : Etienne NOONAN VR [de-identified] : [id number] [de-identified] : 27 February 2017 [de-identified] : 42 [de-identified] : 9.7  years [de-identified] : Shock impedance 48/67\par No VT/VF events\par Optivol not crossed threshold

## 2019-03-28 NOTE — HISTORY OF PRESENT ILLNESS
[None] : The patient complains of no symptoms [de-identified] : Mr. Layton is a 73 year old man with DM, AWMI, NSVT and inducible VT at EPS.  A single chamber ICD was implanted in 2000 with generator change in 2/2008 and 2/2017.  He has been maintained on Sotalol for arrhythmia suppression.  \par \par Throughout the years he has had VT terminated by [employer] and had an appropriate ICD shock in 2010, with successful arrhythmia termination.  Echo and stress test were completed.  Sotalol was increased from 80 to 120mg TID.  \par \par Recently presented with c/o ICD shock 11/25/18.  Admits to some dietary noncompliance.  Also concerned because he has been receiving Sotalol from a different .  On 1/24 the patient received another shock corresponding with VT at 188 bpm. States that he was compliant with sotalol and feeling well. He notes that seconds prior to the shock he felt some dizziness, the shock occurred and he felt better. \par \par We had planned VT management with an ablation, but first sent the patient for an ischemic work-up with Dr. Rashid. He had a diagnostic R and L heart cath on 3/8 which was c/b a L thalamic stroke requiring admission. The patient presents today feeling well. No recurrent shocks, no deficits from stroke. \par \par Tolerating Coumadin without bleeding issues.

## 2019-03-31 PROBLEM — Z78.9 CONSUMES ALCOHOL OCCASIONALLY: Status: ACTIVE | Noted: 2019-03-28

## 2019-03-31 NOTE — HISTORY OF PRESENT ILLNESS
[FreeTextEntry1] : Patient doing well with full recovery of language from his recent aphasic stroke\par cont on eliquis\par entresto added back\par doing well

## 2019-03-31 NOTE — PHYSICAL EXAM
[FreeTextEntry1] : The patient is alert and oriented x3, naming intact x3, repetition normal, follows three-step commands, and is able to participate fully in the history taking.\par Speech is normal with no evidence of dysarthria.\par Memory is intact: Immediate recall 3 out of 3, short-term 3 out of 3, remote memory intact\par Cranial nerves II through XII intact\par Motor exam: Upper and lower extremities 5 out of 5 power, normal tone. No abnormal movements noted.\par Sensory exam: Intact to light touch and pinprick. Romberg negative.\par Coordination and vestibular exam: Finger to nose intact, no evidence of truncal or appendicular ataxia. No evidence of nystagmus. no vestibular symptoms elicited with head turning during ambulation.\par Gait: Normal stance and gait.\par Reflexes: One to 2+ in upper and lower extremities. No pathological reflexes. Downgoing toes.\par \par HEENT: Normocephalic atraumatic\par Funduscopic: No disc edema\par Neck supple with no JVD. No evidence of carotid bruit.\par Cardiac: S1-S2 regular rate and rhythm, no murmur noted.\par Chest: Clear to auscultation\par Abdomen: nontender, normal bowel sounds, soft\par Extremity: No edema, normal pulses.\par \par

## 2019-05-31 ENCOUNTER — APPOINTMENT (OUTPATIENT)
Dept: NEUROLOGY | Facility: CLINIC | Age: 74
End: 2019-05-31
Payer: MEDICARE

## 2019-05-31 ENCOUNTER — APPOINTMENT (OUTPATIENT)
Dept: HEART AND VASCULAR | Facility: CLINIC | Age: 74
End: 2019-05-31
Payer: MEDICARE

## 2019-05-31 VITALS
DIASTOLIC BLOOD PRESSURE: 68 MMHG | HEART RATE: 68 BPM | WEIGHT: 190 LBS | OXYGEN SATURATION: 98 % | BODY MASS INDEX: 26.6 KG/M2 | SYSTOLIC BLOOD PRESSURE: 101 MMHG | HEIGHT: 71 IN

## 2019-05-31 VITALS
BODY MASS INDEX: 26.6 KG/M2 | HEART RATE: 58 BPM | HEIGHT: 71 IN | WEIGHT: 190 LBS | SYSTOLIC BLOOD PRESSURE: 118 MMHG | DIASTOLIC BLOOD PRESSURE: 61 MMHG

## 2019-05-31 PROCEDURE — 93282 PRGRMG EVAL IMPLANTABLE DFB: CPT

## 2019-05-31 PROCEDURE — 99215 OFFICE O/P EST HI 40 MIN: CPT | Mod: 25

## 2019-05-31 PROCEDURE — 99214 OFFICE O/P EST MOD 30 MIN: CPT

## 2019-05-31 RX ORDER — SACUBITRIL AND VALSARTAN 49; 51 MG/1; MG/1
49-51 TABLET, FILM COATED ORAL TWICE DAILY
Refills: 0 | Status: ACTIVE | COMMUNITY

## 2019-05-31 RX ORDER — ATORVASTATIN CALCIUM 20 MG/1
20 TABLET, FILM COATED ORAL DAILY
Refills: 0 | Status: ACTIVE | COMMUNITY

## 2019-05-31 NOTE — PROCEDURE
[No] : not [NSR] : normal sinus rhythm [ICD] : Implantable cardioverter-defibrillator [VVI] : VVI [Charge Time: ___ sec] : charge time was [unfilled] seconds [Longevity: ___ months] : The estimated remaining battery life is [unfilled] months [Normal] : The battery status is normal. [Threshold Testing Performed] : Threshold testing was performed [Lead Imp:  ___ohms] : lead impedance was [unfilled] ohms [Sensing Amplitude ___mv] : sensing amplitude was [unfilled] mv [___V @] : [unfilled] V [___ ms] : [unfilled] ms [None] : none [Sense ___ %] : Sense [unfilled]% [de-identified] : Medtronic [de-identified] : Etienne NOONAN VR [de-identified] : [id number] [de-identified] : 27 February 2017 [de-identified] : 42 [de-identified] : 9.5  years [de-identified] : Shock impedance 47/68\par shocked for MMVT on 5/24/19 while sleeping at 1:05AM. , ATP did not terminate VT. One successful shock delivered \par Optivol was crossed in late April and is back at baseline\par

## 2019-05-31 NOTE — HISTORY OF PRESENT ILLNESS
[FreeTextEntry1] : Dr. Boyle was seen today and Dr. Oliva (cardiologist- been going to him for 25 years). Patient says defibrilliator went off--24th of may at 1 am in the morning and also 25th of jan/ and november.\par \par Game plan is on hold- what triggered march 8 had the angiogram, and then had a mini stroke, 2 days later and confirmed it. march 11 was scheduled for ablation- got cancelled, and wants to continue monitoring.\par \par overall doing well- no problems with speech,weakness, no headaches, blurryvision. \par \par

## 2019-05-31 NOTE — DISCUSSION/SUMMARY
[AICD Function Normal] : normal AICD function [Patient] : the patient [FreeTextEntry1] : In summary, Mr. Layton is a 73 y/o man with ICMO s/p a secondary prevention medtronic single chamber ICD with 3 shocks for VT in the last 5 months. He had a LHC in 3/2019 with minimal sedation and his blood pressure dropped resulting in an ischemic thalamic CVA with right sided weakness (since recovered). The plan at that time was to do an ischemia evaluation and, if no PCI performed, then follow with a VT ablation. CTA of the head/neck shows no obstruction (3/8/19). Given the patient's propensity for cerebral hypoperfusion with just mild sedation, we feel that a VT ablation would put the patient at an elevated risk for CVA. He is already on a good dose of Sotalol which should continue. He may be a good candidate to undergo cyber-knife VT ablation as this would not require sedation. ...\par \par Dayton Benitez MD\par EP Fellow - PGY8

## 2019-05-31 NOTE — HISTORY OF PRESENT ILLNESS
[None] : The patient complains of no symptoms [de-identified] : Mr. 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  A single chamber ICD was implanted in 2000 with generator change in 2/2008 and 2/2017.  He has been maintained on Sotalol for arrhythmia suppression.  He has had 3 ICD shocks in recent months despite compliance with sotalol 120mg po bid. He had a shock last week on 5/24/19 and is here for evaluation.  \par \par Throughout the years he has had VT terminated by [employer] and had an appropriate ICD shock in 2010, with successful arrhythmia termination.  Echo and stress test were completed.  Sotalol was increased from 80 to 120mg TID.  \par \par Recently presented with c/o ICD shock 11/25/18.  Admits to some dietary noncompliance.  Also concerned because he has been receiving Sotalol from a different .  On 1/24 the patient received another shock corresponding with VT at 188 bpm. States that he was compliant with sotalol and feeling well. He notes that seconds prior to the shock he felt some dizziness, the shock occurred and he felt better. \par \par We had planned VT management with an ablation, but first sent the patient for an ischemic work-up with Dr. Rashid. He had a diagnostic R and L heart cath on 3/8 which was c/b a L thalamic stroke requiring admission. The patient presents today feeling well. No recurrent shocks, no deficits from stroke. \par \par Tolerating Coumadin without bleeding issues.\par \par CT angio of coronaries  on 3/1/19   no ARGELIA, no LA thrombus, Calcium score of 3866 A units, severe stenosis of LAD/D1; dense calcified plaque in prox LCX/large ramus intermedius/prox RCA, severe stenosis cannot be excluded; motion artifact in OM1, distl RCA and RPL significant stenosis cannot be excluded; wall thinning with low attenuation in the basal to apical anterior walls, basal to mid anteroseptum, consistent with MI; LVEF severely depressed with akinesis of the basal to apical anterior walls and basal to mid anteroseptum, LV dilated, LA moderately dilated. \par \par ECHO on 3/1/19 indicated severe dilated LV, severely reduced LV systolic function with EF of 25%, regional WMA, LV diastolic dysfucntion, gr II, no LV thrombi, LA mildly dilated, trace MR/TR/IL. \par \par LHC (in centricity?)

## 2019-05-31 NOTE — REVIEW OF SYSTEMS
[see HPI] : see HPI [Negative] : Heme/Lymph [Fever] : no fever [Chills] : no chills [Feeling Fatigued] : not feeling fatigued [Shortness Of Breath] : no shortness of breath [Dyspnea on exertion] : not dyspnea during exertion [Chest  Pressure] : no chest pressure [Abdominal Pain] : no abdominal pain [Nausea] : no nausea [Vomiting] : no vomiting [Dizziness] : no dizziness

## 2019-09-03 ENCOUNTER — NON-APPOINTMENT (OUTPATIENT)
Age: 74
End: 2019-09-03

## 2019-09-03 ENCOUNTER — APPOINTMENT (OUTPATIENT)
Dept: HEART AND VASCULAR | Facility: CLINIC | Age: 74
End: 2019-09-03
Payer: MEDICARE

## 2019-09-03 VITALS
SYSTOLIC BLOOD PRESSURE: 97 MMHG | WEIGHT: 197 LBS | HEART RATE: 66 BPM | HEIGHT: 71 IN | DIASTOLIC BLOOD PRESSURE: 56 MMHG | BODY MASS INDEX: 27.58 KG/M2

## 2019-09-03 PROCEDURE — 99214 OFFICE O/P EST MOD 30 MIN: CPT | Mod: 25

## 2019-09-03 PROCEDURE — 93282 PRGRMG EVAL IMPLANTABLE DFB: CPT

## 2019-09-03 PROCEDURE — 93000 ELECTROCARDIOGRAM COMPLETE: CPT | Mod: 59

## 2019-09-03 NOTE — HISTORY OF PRESENT ILLNESS
[None] : The patient complains of no symptoms [de-identified] : Mr. 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  A single chamber ICD was implanted in 2000 with generator change in 2/2008 and 2/2017.  Throughout the years he has had VT terminated by [employer] and had an appropriate ICD shock in 2010, with successful arrhythmia termination.  Echo and stress test were completed.  Sotalol was increased from 80 to 120mg TID.  \par \par ICD shock 11/25/18.  Admits to some dietary noncompliance.  Also concerned because he has been receiving Sotalol from a different .  On 1/24 the patient received another shock corresponding with VT at 188 bpm. States that he was compliant with sotalol and feeling well. He notes that seconds prior to the shock he felt some dizziness, the shock occurred and he felt better.  \par \par We had planned VT management with an ablation, but first sent the patient for an ischemic work-up with Dr. Rashid. He had a diagnostic R and L heart cath on 3/8 which was c/b a L thalamic stroke requiring admission.  CTA Head/Neck showed no obstruction.  Given propensity for cerebral hypoperfusion with mild sedation, VT ablation was deferred.  Most recent shock occurred 5/24/19- MMVT that did not terminate with ATP.  The patient presents today feeling well. No recurrent shocks, presyncope/syncope.  Denies deficits from stroke. \par \par Tolerating Coumadin without bleeding issues.\par \par CT angio of coronaries  on 3/1/19   no ARGELIA, no LA thrombus, Calcium score of 3866 A units, severe stenosis of LAD/D1; dense calcified plaque in prox LCX/large ramus intermedius/prox RCA, severe stenosis cannot be excluded; motion artifact in OM1, distl RCA and RPL significant stenosis cannot be excluded; wall thinning with low attenuation in the basal to apical anterior walls, basal to mid anteroseptum, consistent with MI; LVEF severely depressed with akinesis of the basal to apical anterior walls and basal to mid anteroseptum, LV dilated, LA moderately dilated. \par \par ECHO on 3/1/19 indicated severe dilated LV, severely reduced LV systolic function with EF of 25%, regional WMA, LV diastolic dysfucntion, gr II, no LV thrombi, LA mildly dilated, trace MR/TR/CT. \par \par LHC (in centricity?)

## 2019-09-03 NOTE — PHYSICAL EXAM
[General Appearance - Well Developed] : well developed [Normal Appearance] : normal appearance [Well Groomed] : well groomed [No Deformities] : no deformities [General Appearance - Well Nourished] : well nourished [General Appearance - In No Acute Distress] : no acute distress [Heart Sounds] : normal S1 and S2 [Heart Rate And Rhythm] : heart rate and rhythm were normal [] : no respiratory distress [Edema] : no peripheral edema present [Exaggerated Use Of Accessory Muscles For Inspiration] : no accessory muscle use [Respiration, Rhythm And Depth] : normal respiratory rhythm and effort [Auscultation Breath Sounds / Voice Sounds] : lungs were clear to auscultation bilaterally [Left Infraclavicular] : left infraclavicular area [Dry] : dry [Clean] : clean [Well-Healed] : well-healed [Abdomen Mass (___ Cm)] : no abdominal mass palpated [Cyanosis, Localized] : no localized cyanosis [Palpable Crepitus] : no palpable crepitus [Bleeding] : no active bleeding [Foul Odor] : no foul smell [Purulent Drainage] : no purulent drainage [Serosanguineous Drainage] : no serosanquineous drainage [Serous Drainage] : no serous drainage [Erythema] : not erythematous [Warm] : not warm [Tender] : not tender [Indurated] : not indurated [Fluctuant] : not fluctuant

## 2019-09-03 NOTE — PROCEDURE
[No] : not [NSR] : normal sinus rhythm [ICD] : Implantable cardioverter-defibrillator [Charge Time: ___ sec] : charge time was [unfilled] seconds [VVI] : VVI [Longevity: ___ months] : The estimated remaining battery life is [unfilled] months [Normal] : The battery status is normal. [Threshold Testing Performed] : Threshold testing was performed [Lead Imp:  ___ohms] : lead impedance was [unfilled] ohms [Sensing Amplitude ___mv] : sensing amplitude was [unfilled] mv [___V @] : [unfilled] V [___ ms] : [unfilled] ms [None] : none [Sense ___ %] : Sense [unfilled]% [de-identified] : Medtronic [de-identified] : Etienne NOONAN VR [de-identified] : [id number] [de-identified] : [birth year] February 2017 [de-identified] : 42 [de-identified] : 9.2  years [de-identified] : Shock impedance 47\par Optivol at baseline\par 1 episode NSVT 7/1/19 (9 beats)

## 2019-09-24 ENCOUNTER — APPOINTMENT (OUTPATIENT)
Dept: HEART AND VASCULAR | Facility: CLINIC | Age: 74
End: 2019-09-24
Payer: MEDICARE

## 2019-09-24 VITALS
DIASTOLIC BLOOD PRESSURE: 60 MMHG | BODY MASS INDEX: 27.86 KG/M2 | WEIGHT: 199 LBS | SYSTOLIC BLOOD PRESSURE: 104 MMHG | HEIGHT: 71 IN | HEART RATE: 58 BPM

## 2019-09-24 PROCEDURE — 93282 PRGRMG EVAL IMPLANTABLE DFB: CPT

## 2019-09-24 NOTE — END OF VISIT
[>50% of Time Spent on Counseling for ____] : Greater than 50% of the encounter time was spent on counseling for [unfilled] [] : Nurse Practitioner [Time Spent: ___ minutes] : I have spent [unfilled] minutes of face to face time with the patient

## 2019-09-26 NOTE — PHYSICAL EXAM
[General Appearance - Well Developed] : well developed [Normal Appearance] : normal appearance [Well Groomed] : well groomed [No Deformities] : no deformities [General Appearance - Well Nourished] : well nourished [General Appearance - In No Acute Distress] : no acute distress [Heart Rate And Rhythm] : heart rate and rhythm were normal [Edema] : no peripheral edema present [Heart Sounds] : normal S1 and S2 [Exaggerated Use Of Accessory Muscles For Inspiration] : no accessory muscle use [Respiration, Rhythm And Depth] : normal respiratory rhythm and effort [] : no respiratory distress [Auscultation Breath Sounds / Voice Sounds] : lungs were clear to auscultation bilaterally [Clean] : clean [Left Infraclavicular] : left infraclavicular area [Well-Healed] : well-healed [Dry] : dry [Abdomen Mass (___ Cm)] : no abdominal mass palpated [Cyanosis, Localized] : no localized cyanosis [Palpable Crepitus] : no palpable crepitus [Bleeding] : no active bleeding [Purulent Drainage] : no purulent drainage [Foul Odor] : no foul smell [Serosanguineous Drainage] : no serosanquineous drainage [Serous Drainage] : no serous drainage [Erythema] : not erythematous [Warm] : not warm [Tender] : not tender [Indurated] : not indurated [Fluctuant] : not fluctuant

## 2019-09-26 NOTE — PROCEDURE
[No] : not [NSR] : normal sinus rhythm [ICD] : Implantable cardioverter-defibrillator [Charge Time: ___ sec] : charge time was [unfilled] seconds [VVI] : VVI [Longevity: ___ months] : The estimated remaining battery life is [unfilled] months [Normal] : The battery status is normal. [Threshold Testing Performed] : Threshold testing was performed [Lead Imp:  ___ohms] : lead impedance was [unfilled] ohms [Sensing Amplitude ___mv] : sensing amplitude was [unfilled] mv [___V @] : [unfilled] V [___ ms] : [unfilled] ms [None] : none [Sense ___ %] : Sense [unfilled]% [de-identified] : Medtronic [de-identified] : Etienne NOONAN VR [de-identified] : [id number] [de-identified] : 27 February 2017 [de-identified] : 9  years [de-identified] : 42 [de-identified] : Shock impedance 47\par Optivol at baseline\par FVT episode 9/22/19 - EGM c/w VT @ ~ 320 ms, ATP that did not terminate arrhythmia and likely did not capture ventricle.  morphology changed and rate increased to ~ 290 ms.  ATP with another morphology change and rate increase x2. Shock @ 35.8J with successful termination of arrhythmia, SR restored

## 2019-09-26 NOTE — HISTORY OF PRESENT ILLNESS
[None] : The patient complains of no symptoms [de-identified] : Mr. 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  A single chamber ICD was implanted in 2000 with generator change in 2/2008 and 2/2017.  Throughout the years he has had VT terminated by ATP and had an appropriate ICD shock in 2010, with successful arrhythmia termination.  Echo and stress test were completed.  Sotalol was increased from 80 to 120mg TID.  \par \par ICD shock 11/25/18.  Admits to some dietary noncompliance.  Also concerned because he has been receiving Sotalol from a different .  On 1/24 the patient received another shock corresponding with VT at 188 bpm. States that he was compliant with sotalol and feeling well. He notes that seconds prior to the shock he felt some dizziness, the shock occurred and he felt better.  \par \par We had planned VT management with an ablation, but first sent the patient for an ischemic work-up with Dr. Rashid. He had a diagnostic R and L heart cath on 3/8 which was c/b a L thalamic stroke requiring admission.  CTA Head/Neck showed no obstruction.  Given propensity for cerebral hypoperfusion with mild sedation, VT ablation was deferred.  Most recent shock occurred 5/24/19- MMVT that did not terminate with ATP.   Denies deficits from stroke.  He presents today for an acute visit following ICD shock on 9/22/19- reports he was sitting on the couch at the time and perhaps felt a "little jolt" but otherwise no dizziness, presyncope/syncope. \par \par Tolerating Coumadin without bleeding issues.\par \par CT angio of coronaries  on 3/1/19   no ARGELIA, no LA thrombus, Calcium score of 3866 A units, severe stenosis of LAD/D1; dense calcified plaque in prox LCX/large ramus intermedius/prox RCA, severe stenosis cannot be excluded; motion artifact in OM1, distl RCA and RPL significant stenosis cannot be excluded; wall thinning with low attenuation in the basal to apical anterior walls, basal to mid anteroseptum, consistent with MI; LVEF severely depressed with akinesis of the basal to apical anterior walls and basal to mid anteroseptum, LV dilated, LA moderately dilated. \par \par ECHO on 3/1/19 indicated severe dilated LV, severely reduced LV systolic function with EF of 25%, regional WMA, LV diastolic dysfucntion, gr II, no LV thrombi, LA mildly dilated, trace MR/TR/TX. \par \par LHC (in centricity?)

## 2019-11-08 ENCOUNTER — APPOINTMENT (OUTPATIENT)
Dept: HEART AND VASCULAR | Facility: CLINIC | Age: 74
End: 2019-11-08
Payer: MEDICARE

## 2019-11-08 VITALS
SYSTOLIC BLOOD PRESSURE: 117 MMHG | HEIGHT: 71 IN | WEIGHT: 199 LBS | BODY MASS INDEX: 27.86 KG/M2 | DIASTOLIC BLOOD PRESSURE: 61 MMHG

## 2019-11-08 VITALS — HEART RATE: 57 BPM

## 2019-11-08 PROCEDURE — 93282 PRGRMG EVAL IMPLANTABLE DFB: CPT

## 2019-11-08 PROCEDURE — 99214 OFFICE O/P EST MOD 30 MIN: CPT | Mod: 25

## 2019-11-08 NOTE — REVIEW OF SYSTEMS
[see HPI] : see HPI [Negative] : Heme/Lymph [Fever] : no fever [Chills] : no chills [Feeling Fatigued] : not feeling fatigued [Shortness Of Breath] : no shortness of breath [Chest  Pressure] : no chest pressure [Dyspnea on exertion] : not dyspnea during exertion [Abdominal Pain] : no abdominal pain [Nausea] : no nausea [Vomiting] : no vomiting [Dizziness] : no dizziness

## 2019-11-08 NOTE — PHYSICAL EXAM
[General Appearance - Well Developed] : well developed [Normal Appearance] : normal appearance [Well Groomed] : well groomed [General Appearance - Well Nourished] : well nourished [No Deformities] : no deformities [General Appearance - In No Acute Distress] : no acute distress [Heart Sounds] : normal S1 and S2 [Heart Rate And Rhythm] : heart rate and rhythm were normal [Edema] : no peripheral edema present [] : no respiratory distress [Respiration, Rhythm And Depth] : normal respiratory rhythm and effort [Exaggerated Use Of Accessory Muscles For Inspiration] : no accessory muscle use [Left Infraclavicular] : left infraclavicular area [Auscultation Breath Sounds / Voice Sounds] : lungs were clear to auscultation bilaterally [Clean] : clean [Dry] : dry [Well-Healed] : well-healed [Abdomen Mass (___ Cm)] : no abdominal mass palpated [Cyanosis, Localized] : no localized cyanosis [Palpable Crepitus] : no palpable crepitus [Bleeding] : no active bleeding [Foul Odor] : no foul smell [Purulent Drainage] : no purulent drainage [Serosanguineous Drainage] : no serosanquineous drainage [Serous Drainage] : no serous drainage [Erythema] : not erythematous [Warm] : not warm [Tender] : not tender [Indurated] : not indurated [Fluctuant] : not fluctuant

## 2019-11-08 NOTE — PROCEDURE
[No] : not [NSR] : normal sinus rhythm [ICD] : Implantable cardioverter-defibrillator [VVI] : VVI [Charge Time: ___ sec] : charge time was [unfilled] seconds [Longevity: ___ months] : The estimated remaining battery life is [unfilled] months [Normal] : The battery status is normal. [Lead Imp:  ___ohms] : lead impedance was [unfilled] ohms [Threshold Testing Performed] : Threshold testing was performed [___V @] : [unfilled] V [___ ms] : [unfilled] ms [None] : none [Sensing Amplitude ___mv] : sensing amplitude was [unfilled] mv [Sense ___ %] : Sense [unfilled]% [Pace ___ %] : Pace [unfilled]% [de-identified] : Medtronic [de-identified] : Etienne NOONAN VR [de-identified] : [id number] [de-identified] : 40 [de-identified] : 27 February 2017 [de-identified] : Shock impedance 63\par Optivol at baseline [de-identified] : 9  years

## 2019-11-08 NOTE — HISTORY OF PRESENT ILLNESS
[None] : The patient complains of no symptoms [de-identified] : Mr. 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  A single chamber ICD was implanted in 2000 with generator change in 2/2008 and 2/2017.  Throughout the years he has had VT terminated by ATP and had an appropriate ICD shock in 2010, with successful arrhythmia termination.  Echo and stress test were completed.  Sotalol was increased from 80 to 120mg TID.  \par \par ICD shock 11/25/18.  Admits to some dietary noncompliance.  Also concerned because he has been receiving Sotalol from a different .  On 1/24 the patient received another shock corresponding with VT at 188 bpm. States that he was compliant with sotalol and feeling well. He notes that seconds prior to the shock he felt some dizziness, the shock occurred and he felt better.  \par \par VT ablation was planned, but first we sent the patient for an ischemic work-up with Dr. Rashid. He had a diagnostic R and L heart cath on 3/8 which was c/b a L thalamic stroke requiring admission.  CTA Head/Neck showed no obstruction.  Given propensity for cerebral hypoperfusion with mild sedation, VT ablation was deferred.  Most recent shock occurred 5/24/19- MMVT that did not terminate with ATP.   Denies deficits from stroke.  He presents today for f/u visit following ICD shock on 9/22/19 - reports he was sitting on the couch at the time and perhaps felt a "little jolt" but otherwise no dizziness, presyncope/syncope. \par \par Tolerating Coumadin without bleeding issues.\par \par CT angio of coronaries  on 3/1/19   no ARGELIA, no LA thrombus, Calcium score of 3866 A units, severe stenosis of LAD/D1; dense calcified plaque in prox LCX/large ramus intermedius/prox RCA, severe stenosis cannot be excluded; motion artifact in OM1, distl RCA and RPL significant stenosis cannot be excluded; wall thinning with low attenuation in the basal to apical anterior walls, basal to mid anteroseptum, consistent with MI; LVEF severely depressed with akinesis of the basal to apical anterior walls and basal to mid anteroseptum, LV dilated, LA moderately dilated. \par \par ECHO on 3/1/19 indicated severe dilated LV, severely reduced LV systolic function with EF of 25%, regional WMA, LV diastolic dysfucntion, gr II, no LV thrombi, LA mildly dilated, trace MR/TR/KS. \par \par He has been feeling well from the cardiac perspective.

## 2019-11-25 ENCOUNTER — APPOINTMENT (OUTPATIENT)
Dept: NEUROLOGY | Facility: CLINIC | Age: 74
End: 2019-11-25
Payer: MEDICARE

## 2019-11-25 VITALS
HEART RATE: 62 BPM | BODY MASS INDEX: 28.28 KG/M2 | DIASTOLIC BLOOD PRESSURE: 67 MMHG | TEMPERATURE: 97.7 F | SYSTOLIC BLOOD PRESSURE: 111 MMHG | WEIGHT: 202 LBS | OXYGEN SATURATION: 97 % | HEIGHT: 71 IN

## 2019-11-25 PROCEDURE — 99214 OFFICE O/P EST MOD 30 MIN: CPT

## 2019-11-25 NOTE — ASSESSMENT
[FreeTextEntry1] : would recommend continuing current treatment and holding from ablation for now. \par will follow up in 6 months and see how he is doing on beta blocker.

## 2019-11-25 NOTE — HISTORY OF PRESENT ILLNESS
[FreeTextEntry1] : 74 year old male patient with a past medical history of thalamic stroke and ventricular tachycardia presents for follow up visit. \par \par Reports his defibrillator in May and September; recently saw Dr. Boyle in September where he was put on metoprolol and then followed up again on November 8. \par \par Reports daily adherence to eliquis 5mg; no blood in urine and stool. Continues to take lipitor 20mg; no muscle cramps or pain. \par \par No headaches, weakness, speech deficits, etc. \par No language issues.\par \par Has f/u with PCP tomorrow- already did blood work the week prior.

## 2020-01-07 ENCOUNTER — NON-APPOINTMENT (OUTPATIENT)
Age: 75
End: 2020-01-07

## 2020-01-07 ENCOUNTER — APPOINTMENT (OUTPATIENT)
Dept: HEART AND VASCULAR | Facility: CLINIC | Age: 75
End: 2020-01-07
Payer: MEDICARE

## 2020-01-07 VITALS
BODY MASS INDEX: 28.63 KG/M2 | WEIGHT: 200 LBS | SYSTOLIC BLOOD PRESSURE: 122 MMHG | HEART RATE: 69 BPM | DIASTOLIC BLOOD PRESSURE: 61 MMHG | HEIGHT: 70 IN

## 2020-01-07 PROCEDURE — 99213 OFFICE O/P EST LOW 20 MIN: CPT | Mod: 25

## 2020-01-07 PROCEDURE — 93282 PRGRMG EVAL IMPLANTABLE DFB: CPT

## 2020-01-07 NOTE — HISTORY OF PRESENT ILLNESS
[None] : The patient complains of no symptoms [de-identified] : Mr. 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  A single chamber ICD was implanted in 2000 with generator change in 2/2008 and 2/2017.  Throughout the years he has had VT terminated by ATP and had an appropriate ICD shock in 2010, with successful arrhythmia termination.  Echo and stress test were completed.  Sotalol was increased from 80 to 120mg TID.  \par \par ICD shock 11/25/18.  Admits to some dietary noncompliance.  Also concerned because he has been receiving Sotalol from a different .  On 1/24/19 the patient received another shock corresponding with VT at 188 bpm. States that he was compliant with sotalol and feeling well. He notes that seconds prior to the shock he felt some dizziness, the shock occurred and he felt better.  \par \par We had planned VT management with an ablation, but first sent the patient for an ischemic work-up with Dr. Rashid. He had a diagnostic R and L heart cath on 3/8 which was c/b a L thalamic stroke requiring admission.  CTA Head/Neck showed no obstruction.  Given propensity for cerebral hypoperfusion with mild sedation, VT ablation was deferred.  He also had an appropriate ICD shock 5/24/19- MMVT that did not terminate with ATP.   Last ICD shock on 9/22/19- reports he was sitting on the couch at the time and perhaps felt a "little jolt" but otherwise no dizziness, presyncope/syncope. He was placed on Toprol and since then is doing well.  Denies any recurrent ICD shocks.  No palpitations, chest pain, syncope, near syncope.  No device related complaints. \par \par Tolerating Coumadin without bleeding issues.\par \par CT angio of coronaries  on 3/1/19   no ARGELIA, no LA thrombus, Calcium score of 3866 A units, severe stenosis of LAD/D1; dense calcified plaque in prox LCX/large ramus intermedius/prox RCA, severe stenosis cannot be excluded; motion artifact in OM1, distl RCA and RPL significant stenosis cannot be excluded; wall thinning with low attenuation in the basal to apical anterior walls, basal to mid anteroseptum, consistent with MI; LVEF severely depressed with akinesis of the basal to apical anterior walls and basal to mid anteroseptum, LV dilated, LA moderately dilated. \par \par ECHO on 3/1/19 indicated severe dilated LV, severely reduced LV systolic function with EF of 25%, regional WMA, LV diastolic dysfucntion, gr II, no LV thrombi, LA mildly dilated, trace MR/TR/OR. \par \par doing well

## 2020-01-07 NOTE — PROCEDURE
[No] : not [ICD] : Implantable cardioverter-defibrillator [NSR] : normal sinus rhythm [VVI] : VVI [Charge Time: ___ sec] : charge time was [unfilled] seconds [Normal] : The battery status is normal. [Longevity: ___ months] : The estimated remaining battery life is [unfilled] months [Threshold Testing Performed] : Threshold testing was performed [Lead Imp:  ___ohms] : lead impedance was [unfilled] ohms [___V @] : [unfilled] V [Sensing Amplitude ___mv] : sensing amplitude was [unfilled] mv [Sense ___ %] : Sense [unfilled]% [___ ms] : [unfilled] ms [None] : none [de-identified] : Etienne NOONAN VR [de-identified] : Medtronic [de-identified] : 27 February 2017 [de-identified] : [id number] [de-identified] : 42 [de-identified] : Shock impedance 47\par Optivol stable / below baseline\par no events  [de-identified] : 8.7  years

## 2020-01-07 NOTE — PHYSICAL EXAM
[Normal Appearance] : normal appearance [General Appearance - Well Developed] : well developed [Well Groomed] : well groomed [General Appearance - Well Nourished] : well nourished [General Appearance - In No Acute Distress] : no acute distress [No Deformities] : no deformities [Heart Rate And Rhythm] : heart rate and rhythm were normal [Edema] : no peripheral edema present [Heart Sounds] : normal S1 and S2 [Exaggerated Use Of Accessory Muscles For Inspiration] : no accessory muscle use [] : no respiratory distress [Respiration, Rhythm And Depth] : normal respiratory rhythm and effort [Left Infraclavicular] : left infraclavicular area [Well-Healed] : well-healed [Dry] : dry [Clean] : clean [Abdomen Mass (___ Cm)] : no abdominal mass palpated [Palpable Crepitus] : no palpable crepitus [Bleeding] : no active bleeding [Foul Odor] : no foul smell [Purulent Drainage] : no purulent drainage [Serosanguineous Drainage] : no serosanquineous drainage [Serous Drainage] : no serous drainage [Erythema] : not erythematous [Tender] : not tender [Warm] : not warm [Fluctuant] : not fluctuant [Indurated] : not indurated

## 2020-01-07 NOTE — REVIEW OF SYSTEMS
[see HPI] : see HPI [Negative] : Heme/Lymph [Fever] : no fever [Feeling Fatigued] : not feeling fatigued [Chills] : no chills [Shortness Of Breath] : no shortness of breath [Dyspnea on exertion] : not dyspnea during exertion [Chest  Pressure] : no chest pressure [Abdominal Pain] : no abdominal pain [Nausea] : no nausea [Vomiting] : no vomiting [Dizziness] : no dizziness

## 2020-05-12 ENCOUNTER — APPOINTMENT (OUTPATIENT)
Dept: HEART AND VASCULAR | Facility: CLINIC | Age: 75
End: 2020-05-12

## 2020-06-22 ENCOUNTER — APPOINTMENT (OUTPATIENT)
Dept: NEUROLOGY | Facility: CLINIC | Age: 75
End: 2020-06-22

## 2020-06-25 ENCOUNTER — APPOINTMENT (OUTPATIENT)
Dept: NEUROLOGY | Facility: CLINIC | Age: 75
End: 2020-06-25
Payer: MEDICARE

## 2020-06-25 PROCEDURE — 99213 OFFICE O/P EST LOW 20 MIN: CPT | Mod: 95

## 2020-06-25 NOTE — HISTORY OF PRESENT ILLNESS
[Home] : at home, [unfilled] , at the time of the visit. [Medical Office: ([facility])___] : at the medical office located in  [Verbal consent obtained from patient] : the patient, [unfilled] [FreeTextEntry1] : Defibrillator went off again on May 2020. Spoke to EP - no medications were changed and patient is still on metoprolol.\par \par Was switched to Descovy from Truvada even though is kidney function is normal \par \par HPI: 74 year old male patient with a past medical history of thalamic stroke and ventricular tachycardia presents for follow up visit. \par \par Reports his defibrillator in May and September; recently saw Dr. Boyle in September where he was put on metoprolol and then followed up again on November 8. \par \par Reports daily adherence to eliquis 5mg; no blood in urine and stool. Continues to take lipitor 20mg; no muscle cramps or pain. \par \par No headaches, weakness, speech deficits, etc. \par No language issues.\par \par Has f/u with PCP tomorrow- already did blood work the week prior.

## 2020-06-25 NOTE — ASSESSMENT
[FreeTextEntry1] : CVA - stable\par Recommend stopping Descovy due to risk of hyperlipidemia and no issues with renal function with truvada. \par follow up in 6 months

## 2020-08-03 ENCOUNTER — APPOINTMENT (OUTPATIENT)
Dept: HEART AND VASCULAR | Facility: CLINIC | Age: 75
End: 2020-08-03
Payer: MEDICARE

## 2020-08-03 PROCEDURE — 99442: CPT | Mod: 25,95

## 2020-08-03 NOTE — HISTORY OF PRESENT ILLNESS
[Home] : at home, [unfilled] , at the time of the visit. [Medical Office: ([facility])___] : at the medical office located in  [Verbal consent obtained from patient] : the patient, [unfilled] [de-identified] : He experienced two shocks on March 21.  None since.  No other complaints.  Feels well. He is back from [place] and is back in [facility].  We reviewed his remote monitor from yesterday.

## 2020-08-03 NOTE — PROCEDURE
[No] : not [NSR] : normal sinus rhythm [ICD] : Implantable cardioverter-defibrillator [VVI] : VVI [Normal] : The battery status is normal. [Longevity: ___ months] : The estimated remaining battery life is [unfilled] months [Charge Time: ___ sec] : charge time was [unfilled] seconds [Threshold Testing Performed] : Threshold testing was performed [___V @] : [unfilled] V [Lead Imp:  ___ohms] : lead impedance was [unfilled] ohms [Sensing Amplitude ___mv] : sensing amplitude was [unfilled] mv [Sense ___ %] : Sense [unfilled]% [None] : none [___ ms] : [unfilled] ms [de-identified] : Medtronic [de-identified] : Etienne NOONAN VR [de-identified] : [id number] [de-identified] : 27 February 2017 [de-identified] : 42 [de-identified] : 8 years [de-identified] : Shock impedance 47\par Optivol stable / below baseline\par no new events since March 21

## 2020-09-25 ENCOUNTER — APPOINTMENT (OUTPATIENT)
Dept: UROLOGY | Facility: CLINIC | Age: 75
End: 2020-09-25
Payer: MEDICARE

## 2020-09-25 VITALS — DIASTOLIC BLOOD PRESSURE: 75 MMHG | SYSTOLIC BLOOD PRESSURE: 112 MMHG | TEMPERATURE: 94.8 F | HEART RATE: 63 BPM

## 2020-09-25 PROCEDURE — 99204 OFFICE O/P NEW MOD 45 MIN: CPT

## 2020-09-25 RX ORDER — TADALAFIL 20 MG/1
20 TABLET ORAL DAILY
Qty: 6 | Refills: 11 | Status: ACTIVE | COMMUNITY
Start: 2020-09-25 | End: 1900-01-01

## 2020-09-25 NOTE — ASSESSMENT
[FreeTextEntry1] : arterial ED\par trial cialis 20\par if fails for penile ultrasound \par f/u 1 [facility]

## 2020-09-25 NOTE — PHYSICAL EXAM
[General Appearance - Well Developed] : well developed [General Appearance - Well Nourished] : well nourished [Normal Appearance] : normal appearance [Well Groomed] : well groomed [Heart Rate And Rhythm] : Heart rate and rhythm were normal [] : no respiratory distress [Abdomen Soft] : soft [Abdomen Tenderness] : non-tender [Abdomen Hernia] : no hernia was discovered [Costovertebral Angle Tenderness] : no ~M costovertebral angle tenderness [Urethral Meatus] : meatus normal [Penis Abnormality] : normal circumcised penis [Urinary Bladder Findings] : the bladder was normal on palpation [Scrotum] : the scrotum was normal [Epididymis] : the epididymides were normal [Testes Tenderness] : no tenderness of the testes [Testes Mass (___cm)] : there were no testicular masses [Normal Station and Gait] : the gait and station were normal for the patient's age [Skin Color & Pigmentation] : normal skin color and pigmentation [No Focal Deficits] : no focal deficits [Oriented To Time, Place, And Person] : oriented to person, place, and time [No Palpable Adenopathy] : no palpable adenopathy

## 2020-09-25 NOTE — HISTORY OF PRESENT ILLNESS
[FreeTextEntry1] : 75M AICD DM on PrEP presents with ED noted over the last eyar. nocturnal erections 4-5/10. using viagra 100 occasionally able to penetrate. intermittent response. never tried cialis. good lbido. good energy levels. no hematuria. no dysruia. no fevers chills. no nasuea vomting. no family histoyr prostate kidney bladder cacne.r

## 2020-10-12 ENCOUNTER — APPOINTMENT (OUTPATIENT)
Dept: ENDOCRINOLOGY | Facility: CLINIC | Age: 75
End: 2020-10-12
Payer: MEDICARE

## 2020-10-12 VITALS
DIASTOLIC BLOOD PRESSURE: 71 MMHG | HEIGHT: 70 IN | HEART RATE: 70 BPM | SYSTOLIC BLOOD PRESSURE: 104 MMHG | BODY MASS INDEX: 29.49 KG/M2 | OXYGEN SATURATION: 97 % | WEIGHT: 206 LBS | TEMPERATURE: 96.5 F

## 2020-10-12 PROCEDURE — 99214 OFFICE O/P EST MOD 30 MIN: CPT

## 2020-10-12 PROCEDURE — 97803 MED NUTRITION INDIV SUBSEQ: CPT

## 2020-10-12 PROCEDURE — 99204 OFFICE O/P NEW MOD 45 MIN: CPT

## 2020-10-12 NOTE — REVIEW OF SYSTEMS
[Fatigue] : no fatigue [Decreased Appetite] : appetite not decreased [Dysphagia] : no dysphagia [Dysphonia] : no dysphonia [Chest Pain] : no chest pain [Palpitations] : no palpitations [Shortness Of Breath] : no shortness of breath [Cough] : no cough [Nausea] : no nausea [Constipation] : no constipation [Vomiting] : no vomiting [Diarrhea] : no diarrhea [Joint Pain] : no joint pain [Headaches] : no headaches [Tremors] : no tremors [Depression] : no depression

## 2020-10-12 NOTE — HISTORY OF PRESENT ILLNESS
[FreeTextEntry1] : Patient is a 74 yo man with Type 2 diabetes history of thalamic stroke, VT, HTN, HLD establishing endocrine care for diabetes.\par \par The patient was diagnosed with diabetes around 2018 based on an elevated A1c.  Had previously seen Dr. Byers in 2018.  At the time, he was on metformin and repaglinide. Last Monday, patient had an A1c was high and was concerned.\par Diagnosed with diabetes a few years ago and was started on oral medicines.  \par Was not checking sugars but had nutrition visit today.  Patient will try to start checking today.\par Breakfast: usually eats cereal most of the time, rodney and eggs; shredded wheat or Cheerios with strawberries, blueberries, almond milk \par Lunch:  varies, does not cook so buys food in the store.  Pasta dishes, meat dishes, occasional fast food\par Dinner: also doesn't cook, restaurant food, rarely cooks salmon\par Has a sweet tooth at times, likes [employer]-bought a box recently but tries not to; ice cream\par No soda and no juice; tomato juice once in a while\par Cut out bananas and orange juice\par Dilated eye exam: history of cataracts; last visit 1 year ago with Dr. Petty\par Plans to go back to [place] in February, he goes back and forth\par No history of pancreatitis; no personal/family history of MEN/MTC

## 2020-10-12 NOTE — ASSESSMENT
[Diabetes Foot Care] : diabetes foot care [Long Term Vascular Complications] : long term vascular complications of diabetes [Carbohydrate Consistent Diet] : carbohydrate consistent diet [Importance of Diet and Exercise] : importance of diet and exercise to improve glycemic control, achieve weight loss and improve cardiovascular health [Exercise/Effect on Glucose] : exercise/effect on glucose [Self Monitoring of Blood Glucose] : self monitoring of blood glucose [Retinopathy Screening] : Patient was referred to ophthalmology for retinopathy screening [FreeTextEntry1] : Patient is a 76 yo man with uncontrolled T2DM (A1c 8.5%), HTN, HLD establishing endocrine care for type 2 diabetes\par \par 1.  T2DM\par -patient's A1c is above goal of 7-7.5%\par -his main challenge is that he does not cook and most foods are purchased or carbohydrate heavy including cereals.  He has met with RD this morning and was given education about healthy diet which was also reinforced at this visit.\par -he has received a glucometer for SMBG as well\par -right now, his medications are metformin and repaglinide with meals.  Additive therapy with either a GLP1 agonist or SGLT2 inhibitor would be idea from the cardiac standpoint. The patient, may have to do penile injections for erectile dysfunction and as such, would avoid SGLT2 inhibitors which can increase genital/mycotic infections\par -for now, will start low dose Trulicity. GI side effects were discussed\par -education on injection also provided\par -requires annual dilated eye exam\par -microalbumin (see scanned labs)\par -monofilament testing done today\par \par 2. HLD\par -statin\par \par 3. HTN\par -BP goal < 140/90\par \par 4. Osteoporosis screening\par -recommend screening DXA\par -patient will discuss with PCP \par \par At this time, follow up in Mid-December at which point we will repeat A1c.  Patient to call with hypo/hyperglycemic excursions

## 2020-10-12 NOTE — CONSULT LETTER
[Dear  ___] : Dear  [unfilled], [Consult Letter:] : I had the pleasure of evaluating your patient, [unfilled]. [Please see my note below.] : Please see my note below. [Consult Closing:] : Thank you very much for allowing me to participate in the care of this patient.  If you have any questions, please do not hesitate to contact me. [Sincerely,] : Sincerely, [FreeTextEntry3] : Shelley Rashid MD

## 2020-10-12 NOTE — PHYSICAL EXAM
[Alert] : alert [Well Nourished] : well nourished [Healthy Appearance] : healthy appearance [EOMI] : extra ocular movement intact [Normal Hearing] : hearing was normal [No Respiratory Distress] : no respiratory distress [No Accessory Muscle Use] : no accessory muscle use [Clear to Auscultation] : lungs were clear to auscultation bilaterally [Normal to Percussion] : lungs were normal to percussion [Normal S1, S2] : normal S1 and S2 [Normal Rate] : heart rate was normal [Kyphosis] : kyphosis present [No Stigmata of Cushings Syndrome] : no stigmata of Cushings Syndrome [Normal Gait] : normal gait [No Rash] : no rash [Foot Ulcers] : no foot ulcers [Right Foot Was Examined] : right foot ~C was examined [Left Foot Was Examined] : left foot ~C was examined [Normal] : normal [Full ROM] : with full range of motion [Swelling] : not swollen [Tenderness] : not tender [Erythema] : not erythematous [2+] : 2+ in the dorsalis pedis [#1 Diminished] : number 1 was normal [#2 Diminished] : number 2 was normal [#3 Diminished] : number 3 was normal [#4 Diminished] : number 4 was normal [#5 Diminished] : number 5 was normal [#6 Diminished] : number 6 was normal [#7 Diminished] : number 7 was normal [#8 Diminished] : number 8 was normal [#9 Diminished] : number 9 was normal [#10 Diminished] : number 10 was normal [Normal Affect] : the affect was normal [Normal Insight/Judgement] : insight and judgment were intact [Normal Mood] : the mood was normal [de-identified] : left cardiac device

## 2020-10-12 NOTE — REASON FOR VISIT
[Initial Evaluation] : an initial evaluation [DM Type 2] : DM Type 2 [FreeTextEntry2] : Dr. Steve Pichardo, [place]

## 2020-10-15 RX ORDER — BLOOD SUGAR DIAGNOSTIC
STRIP MISCELLANEOUS DAILY
Qty: 1 | Refills: 3 | Status: ACTIVE | COMMUNITY
Start: 2020-10-13

## 2020-10-20 ENCOUNTER — APPOINTMENT (OUTPATIENT)
Dept: HEART AND VASCULAR | Facility: CLINIC | Age: 75
End: 2020-10-20
Payer: MEDICARE

## 2020-10-20 VITALS
SYSTOLIC BLOOD PRESSURE: 105 MMHG | DIASTOLIC BLOOD PRESSURE: 66 MMHG | HEART RATE: 71 BPM | BODY MASS INDEX: 28.63 KG/M2 | TEMPERATURE: 97.6 F | WEIGHT: 200 LBS | HEIGHT: 70 IN

## 2020-10-20 PROCEDURE — 93290 INTERROG DEV EVAL ICPMS IP: CPT | Mod: 26

## 2020-10-20 PROCEDURE — 93282 PRGRMG EVAL IMPLANTABLE DFB: CPT

## 2020-10-23 ENCOUNTER — APPOINTMENT (OUTPATIENT)
Dept: UROLOGY | Facility: CLINIC | Age: 75
End: 2020-10-23
Payer: MEDICARE

## 2020-10-23 VITALS — TEMPERATURE: 97.2 F | HEART RATE: 72 BPM | DIASTOLIC BLOOD PRESSURE: 66 MMHG | SYSTOLIC BLOOD PRESSURE: 99 MMHG

## 2020-10-23 PROCEDURE — 54235 NJX CORPORA CAVERNOSA RX AGT: CPT

## 2020-10-23 PROCEDURE — 93980 PENILE VASCULAR STUDY: CPT

## 2020-10-23 PROCEDURE — 99213 OFFICE O/P EST LOW 20 MIN: CPT | Mod: 25

## 2020-10-26 RX ORDER — APIXABAN 5 MG/1
5 TABLET, FILM COATED ORAL
Refills: 0 | Status: ACTIVE | COMMUNITY

## 2020-10-26 NOTE — PHYSICAL EXAM
[General Appearance - Well Developed] : well developed [Normal Appearance] : normal appearance [Well Groomed] : well groomed [General Appearance - Well Nourished] : well nourished [No Deformities] : no deformities [General Appearance - In No Acute Distress] : no acute distress [Left Infraclavicular] : left infraclavicular area [Clean] : clean [Dry] : dry [Well-Healed] : well-healed

## 2020-10-26 NOTE — DISCUSSION/SUMMARY
[AICD Function Normal] : normal AICD function [Routine Follow-up in 3-4 months] : routine follow-up in 3-4 months

## 2020-10-26 NOTE — PROCEDURE
[No] : not [NSR] : normal sinus rhythm [ICD] : Implantable cardioverter-defibrillator [VVI] : VVI [Charge Time: ___ sec] : charge time was [unfilled] seconds [Longevity: ___ months] : The estimated remaining battery life is [unfilled] months [Normal] : The battery status is normal. [Threshold Testing Performed] : Threshold testing was performed [Lead Imp:  ___ohms] : lead impedance was [unfilled] ohms [Sensing Amplitude ___mv] : sensing amplitude was [unfilled] mv [___V @] : [unfilled] V [___ ms] : [unfilled] ms [None] : none [Sense ___ %] : Sense [unfilled]% [de-identified] : Medtronic [de-identified] : Etienne NOONAN VR [de-identified] : [id number] [de-identified] : 27 February 2017 [de-identified] : 42 [de-identified] : 7.6 years [de-identified] : Shock impedance 51/68\par Optivol stable / below baseline\par no new events since May 2020

## 2020-10-26 NOTE — HISTORY OF PRESENT ILLNESS
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 A single chamber ICD was implanted in 2000 with generator change in 2/2008 and 2/2017. Throughout the years he has had VT terminated by ATP and had an appropriate ICD shock in 2010, with successful arrhythmia termination. Echo and stress test were completed. Sotalol was increased from 80 to 120mg TID. \par \par He had another ICD shock 11/25/18. Admits to some dietary noncompliance. Also concerned because he has been receiving Sotalol from a different . On 1/24/2019 the patient received another shock corresponding with VT at 188 bpm. States that he was compliant with sotalol and feeling well. He notes that seconds prior to the shock he felt some dizziness, the shock occurred and he felt better. \par \par VT ablation was planned, but first we sent the patient for an ischemic work-up with Dr. Rashid. He had a diagnostic R and L heart cath on 3/8/2019 which was c/b a L thalamic stroke requiring admission. CTA Head/Neck showed no obstruction. Given propensity for cerebral hypoperfusion with mild sedation, VT ablation was deferred. Recurrent shocks 5/24/19- MMVT that did not terminate with ATP,  9/22/19 - reports he was sitting on the couch at the time and perhaps felt a "little jolt" and 5/2020 ATP, followed by 1 shock. \par \par He presents today feeling well and endorsing no further shocks. Tolerating Eliquis without bleeding issues.\par \par CT angio of coronaries on 3/1/19 no ARGELIA, no LA thrombus, Calcium score of 3866 A units, severe stenosis of LAD/D1; dense calcified plaque in prox LCX/large ramus intermedius/prox RCA, severe stenosis cannot be excluded; motion artifact in OM1, distl RCA and RPL significant stenosis cannot be excluded; wall thinning with low attenuation in the basal to apical anterior walls, basal to mid anteroseptum, consistent with MI; LVEF severely depressed with akinesis of the basal to apical anterior walls and basal to mid anteroseptum, LV dilated, LA moderately dilated. \par \par ECHO on 3/1/19 indicated severe dilated LV, severely reduced LV systolic function with EF of 25%, regional WMA, LV diastolic dysfucntion, gr II, no LV thrombi, LA mildly dilated, trace MR/TR/IL. \par

## 2020-10-27 NOTE — ASSESSMENT
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 Specifically the risk of priapism was discussed. He understands that should he have an erection that lasts greater than 2 hours that he is to take 120 mg of Sudafed. Immediately afterward he is to call my office so that we can arrange for his further followup. He understands that if he has a four-hour erection that he should go straight away to the emergency department for further treatment. In addition, the as yet undefined risk of increased penile scar tissue, as well as Peyronie's disease, with the use of intercavernous injection therapies was discussed. He opts to proceed.\par for training with 5 units

## 2020-10-27 NOTE — HISTORY OF PRESENT ILLNESS
[FreeTextEntry1] : see attached doppler\par good response to trimix 5 units\par strong interst in injection therapy

## 2020-11-03 ENCOUNTER — APPOINTMENT (OUTPATIENT)
Dept: UROLOGY | Facility: CLINIC | Age: 75
End: 2020-11-03

## 2020-11-12 ENCOUNTER — APPOINTMENT (OUTPATIENT)
Dept: UROLOGY | Facility: CLINIC | Age: 75
End: 2020-11-12
Payer: MEDICARE

## 2020-11-12 VITALS — TEMPERATURE: 97.2 F | SYSTOLIC BLOOD PRESSURE: 95 MMHG | HEART RATE: 74 BPM | DIASTOLIC BLOOD PRESSURE: 67 MMHG

## 2020-11-12 PROCEDURE — 99214 OFFICE O/P EST MOD 30 MIN: CPT

## 2020-11-12 RX ORDER — BLOOD-GLUCOSE METER
70 EACH MISCELLANEOUS
Qty: 1 | Refills: 0 | Status: ACTIVE | COMMUNITY
Start: 2020-11-12 | End: 1900-01-01

## 2020-11-12 NOTE — ASSESSMENT
[FreeTextEntry1] : ED\par \par Extra education provided on how to draw up the correct dosage as patient initially had difficulty reading the numbers on the syringe. \par \par Able to administer 5 units of TRIMIX with sterile aspiration and correct injection technique. Education with illustration provided. Response ( 5/10) in office. Risks of intracavernous injection therapy were discussed at length. Specifically the risk of priapism was discussed. He understands that should he have an erection that lasts greater than 2 hours, that he is to take 120 mg of Sudafed. Immediately afterwards he is to call the office so that we can arrange for his further follow-up. He understands that if he has a four-hour erection, that he should do straight away to the emergency department for further treatment. In addition, the as yet undefined risk of increased penile scar tissue, as well as Peyronie's disease, with the use of intracavernous injection therapies was discussed. Will call with home results.

## 2020-11-12 NOTE — HISTORY OF PRESENT ILLNESS
[FreeTextEntry1] : 75M with ED\par Here today for ICI training \par TRIMIX 5 units today\par Denies urinary complaints

## 2020-11-23 ENCOUNTER — NON-APPOINTMENT (OUTPATIENT)
Age: 75
End: 2020-11-23

## 2020-12-11 ENCOUNTER — APPOINTMENT (OUTPATIENT)
Dept: UROLOGY | Facility: CLINIC | Age: 75
End: 2020-12-11
Payer: MEDICARE

## 2020-12-11 VITALS — TEMPERATURE: 96.3 F | HEART RATE: 80 BPM | SYSTOLIC BLOOD PRESSURE: 98 MMHG | DIASTOLIC BLOOD PRESSURE: 65 MMHG

## 2020-12-11 PROCEDURE — 99214 OFFICE O/P EST MOD 30 MIN: CPT

## 2020-12-11 NOTE — ASSESSMENT
[FreeTextEntry1] : ICI\par \par Able to administer 5 units of TRIMIX correctly with sterile aspiration and correct injection technique. Education with illustration provided. Response ( 5/10) in office. Risks of intracavernous injection therapy were discussed at length. Specifically the risk of priapism was discussed. He understands that should he have an erection that lasts greater than 2 hours, that he is to take 120 mg of Sudafed. Immediately afterwards he is to call the office so that we can arrange for his further follow-up. He understands that if he has a four-hour erection, that he should do straight away to the emergency department for further treatment. In addition, the as yet undefined risk of increased penile scar tissue, as well as Peyronie's disease, with the use of intracavernous injection therapies was discussed. Will call with home results. \par Discussed increase of TRIMIX to 10 units, will call office with results\par \par

## 2020-12-11 NOTE — PHYSICAL EXAM
[General Appearance - Well Developed] : well developed [General Appearance - Well Nourished] : well nourished [Normal Appearance] : normal appearance [Well Groomed] : well groomed [General Appearance - In No Acute Distress] : no acute distress [Abdomen Soft] : soft [Abdomen Tenderness] : non-tender [Costovertebral Angle Tenderness] : no ~M costovertebral angle tenderness [Urethral Meatus] : meatus normal [Urinary Bladder Findings] : the bladder was normal on palpation [Scrotum] : the scrotum was normal [No Prostate Nodules] : no prostate nodules [] : no respiratory distress [Respiration, Rhythm And Depth] : normal respiratory rhythm and effort [Exaggerated Use Of Accessory Muscles For Inspiration] : no accessory muscle use [Oriented To Time, Place, And Person] : oriented to person, place, and time [Affect] : the affect was normal [Mood] : the mood was normal [Not Anxious] : not anxious

## 2020-12-11 NOTE — HISTORY OF PRESENT ILLNESS
[FreeTextEntry1] : 75M with ED\par Here today for ICI training \par Has been using TRIMIX 5 units, following up today to ensure that he is injecting correclty\par Denies urinary complaints \par

## 2020-12-21 ENCOUNTER — NON-APPOINTMENT (OUTPATIENT)
Age: 75
End: 2020-12-21

## 2020-12-24 ENCOUNTER — APPOINTMENT (OUTPATIENT)
Dept: ENDOCRINOLOGY | Facility: CLINIC | Age: 75
End: 2020-12-24
Payer: MEDICARE

## 2020-12-24 VITALS
HEART RATE: 87 BPM | DIASTOLIC BLOOD PRESSURE: 64 MMHG | SYSTOLIC BLOOD PRESSURE: 98 MMHG | OXYGEN SATURATION: 98 % | BODY MASS INDEX: 27.2 KG/M2 | WEIGHT: 190 LBS | TEMPERATURE: 96.3 F | HEIGHT: 70 IN

## 2020-12-24 LAB — HBA1C MFR BLD HPLC: 6.1

## 2020-12-24 PROCEDURE — 99214 OFFICE O/P EST MOD 30 MIN: CPT

## 2020-12-24 NOTE — HISTORY OF PRESENT ILLNESS
[FreeTextEntry1] : Patient is a 74 yo man with Type 2 diabetes history of thalamic stroke, VT, HTN, HLD establishing endocrine care for diabetes.\par \par The patient was diagnosed with diabetes around 2018 based on an elevated A1c. Had previously seen Dr. Byers in 2018. At the time, he was on metformin and repaglinide. October 2020, patient had an A1c of 8.5% which is high and was concerned.\par Current medications include metformin 1000 mg BID and repaglinide. Trulicity was added but he was reported dry skin. Denies nausea.  Saw the dermatologist yesterday who stated it was eczema and provided cream\par Was not checking sugars but had nutrition visit today. Patient was provided a glucometer but does not check, does not like the "ritual."\par Breakfast: usually eats cereal most of the time, rodney and eggs; shredded wheat or Cheerios with strawberries, blueberries, almond milk \par Lunch: varies, does not cook so buys food in the store. Pasta dishes, meat dishes, occasional fast food\par Dinner: also doesn't cook, restaurant food, rarely cooks salmon\par No soda and no juice; tomato juice once in a while\par Stopped drinking orange juice\par He cut down on sweets including chocolate, cakes and ice cream\par Dilated eye exam: history of cataracts; last visit 1 year ago with Dr. Petty\par Plans to go back to [place] in February, he goes back and forth. Waiting to get the COVID vaccine before finalizing travel plans.\par No history of pancreatitis; no personal/family history of MEN/MTC

## 2020-12-24 NOTE — REVIEW OF SYSTEMS
[Fatigue] : no fatigue [Decreased Appetite] : appetite not decreased [Dysphagia] : no dysphagia [Dysphonia] : no dysphonia [Chest Pain] : no chest pain [Slow Heart Rate] : heart rate is not slow [Palpitations] : no palpitations [Fast Heart Rate] : heart rate is not fast [Shortness Of Breath] : no shortness of breath [Cough] : no cough [Nausea] : no nausea [Constipation] : no constipation [Vomiting] : no vomiting [Diarrhea] : no diarrhea [Headaches] : no headaches [Tremors] : no tremors [Cold Intolerance] : no cold intolerance [Heat Intolerance] : no heat intolerance [FreeTextEntry2] : States he cut back on sweets, desserts, juices

## 2020-12-24 NOTE — PHYSICAL EXAM
[Alert] : alert [Well Nourished] : well nourished [No Acute Distress] : no acute distress [EOMI] : extra ocular movement intact [Normal Hearing] : hearing was normal [No Respiratory Distress] : no respiratory distress [No Accessory Muscle Use] : no accessory muscle use [Clear to Auscultation] : lungs were clear to auscultation bilaterally [Normal to Percussion] : lungs were normal to percussion [Normal Rate] : heart rate was normal [Normal Bowel Sounds] : normal bowel sounds [Not Tender] : non-tender [Soft] : abdomen soft [No Stigmata of Cushings Syndrome] : no stigmata of Cushings Syndrome [Normal Gait] : normal gait [No Joint Swelling] : no joint swelling seen [No Motor Deficits] : the motor exam was normal [Normal Affect] : the affect was normal [Normal Insight/Judgement] : insight and judgment were intact [Normal Mood] : the mood was normal

## 2020-12-24 NOTE — ASSESSMENT
[FreeTextEntry1] : Patient is a 76 yo man with type 2 diabetes, HTN, HLD here for diabetes follow up.\par \par 1. T2DM\par -patient's POCT A1c today is 6.1% which is an improvement from October A1c of 8.5%.  He attributed the high A1c to liberal diet and increased sugar intake.  Since then he has made behavioral modifications including cutting out juices and decreasing desserts.\par -he feels well\par -offered option of stopping trulicity based on tightly controlled A1c but he declines and prefers to stay on this regimen until the next visit\par -hypoglycemia education was provided\par -discussed that he should be checking sugar\par -up to date with dilated eye exam\par -continue trulicity, metformin and repaglinide at current doses\par -patient saw dermatologist yesterday who diagnosed him with having eczema and prescribed topical creams.  States its unlikely a reaction to trulicity\par \par 2. HTN\par -BP goal < 140/90\par -continue cardiology care\par \par 3. HLD\par -statin\par \par 4. Osteoporosis screening\par -DXA referral provided today\par \par Follow up in 4 months, sooner if needed [Carbohydrate Consistent Diet] : carbohydrate consistent diet [Importance of Diet and Exercise] : importance of diet and exercise to improve glycemic control, achieve weight loss and improve cardiovascular health [Exercise/Effect on Glucose] : exercise/effect on glucose [Self Monitoring of Blood Glucose] : self monitoring of blood glucose [Retinopathy Screening] : Patient was referred to ophthalmology for retinopathy screening

## 2021-01-05 ENCOUNTER — APPOINTMENT (OUTPATIENT)
Dept: ENDOCRINOLOGY | Facility: CLINIC | Age: 76
End: 2021-01-05

## 2021-02-16 ENCOUNTER — APPOINTMENT (OUTPATIENT)
Dept: HEART AND VASCULAR | Facility: CLINIC | Age: 76
End: 2021-02-16

## 2021-03-09 ENCOUNTER — NON-APPOINTMENT (OUTPATIENT)
Age: 76
End: 2021-03-09

## 2021-03-09 ENCOUNTER — APPOINTMENT (OUTPATIENT)
Dept: HEART AND VASCULAR | Facility: CLINIC | Age: 76
End: 2021-03-09
Payer: MEDICARE

## 2021-03-09 VITALS
WEIGHT: 188 LBS | BODY MASS INDEX: 26.92 KG/M2 | HEIGHT: 70 IN | DIASTOLIC BLOOD PRESSURE: 63 MMHG | HEART RATE: 75 BPM | TEMPERATURE: 97.4 F | SYSTOLIC BLOOD PRESSURE: 104 MMHG

## 2021-03-09 PROCEDURE — 99213 OFFICE O/P EST LOW 20 MIN: CPT | Mod: 25

## 2021-03-09 PROCEDURE — 93283 PRGRMG EVAL IMPLANTABLE DFB: CPT

## 2021-03-11 NOTE — PROCEDURE
[No] : not [NSR] : normal sinus rhythm [ICD] : Implantable cardioverter-defibrillator [VVI] : VVI [Charge Time: ___ sec] : charge time was [unfilled] seconds [Longevity: ___ months] : The estimated remaining battery life is [unfilled] months [Normal] : The battery status is normal. [Threshold Testing Performed] : Threshold testing was performed [Lead Imp:  ___ohms] : lead impedance was [unfilled] ohms [Sensing Amplitude ___mv] : sensing amplitude was [unfilled] mv [___V @] : [unfilled] V [___ ms] : [unfilled] ms [None] : none [Sense ___ %] : Sense [unfilled]% [de-identified] : Medtronic [de-identified] : Etienne NOONAN VR [de-identified] : [id number] [de-identified] : 27 February 2017 [de-identified] : 42 [de-identified] : 7.6 years [de-identified] : Shock impedance 51/68\par Optivol stable / below baseline\par no new events since May 2020

## 2021-06-16 ENCOUNTER — RX RENEWAL (OUTPATIENT)
Age: 76
End: 2021-06-16

## 2021-07-06 ENCOUNTER — NON-APPOINTMENT (OUTPATIENT)
Age: 76
End: 2021-07-06

## 2021-07-06 ENCOUNTER — APPOINTMENT (OUTPATIENT)
Dept: HEART AND VASCULAR | Facility: CLINIC | Age: 76
End: 2021-07-06
Payer: MEDICARE

## 2021-07-06 VITALS
BODY MASS INDEX: 28.92 KG/M2 | HEIGHT: 70 IN | DIASTOLIC BLOOD PRESSURE: 60 MMHG | WEIGHT: 202 LBS | HEART RATE: 64 BPM | SYSTOLIC BLOOD PRESSURE: 100 MMHG | TEMPERATURE: 97.1 F

## 2021-07-06 PROCEDURE — 93282 PRGRMG EVAL IMPLANTABLE DFB: CPT

## 2021-07-07 NOTE — PROCEDURE
[No] : not [NSR] : normal sinus rhythm [ICD] : Implantable cardioverter-defibrillator [VVI] : VVI [Charge Time: ___ sec] : charge time was [unfilled] seconds [Normal] : The battery status is normal. [Threshold Testing Performed] : Threshold testing was performed [Lead Imp:  ___ohms] : lead impedance was [unfilled] ohms [Sensing Amplitude ___mv] : sensing amplitude was [unfilled] mv [___V @] : [unfilled] V [___ ms] : [unfilled] ms [None] : none [Sense ___ %] : Sense [unfilled]% [de-identified] : Medtronic [de-identified] : Etienne NOONAN VR [de-identified] : [id number] [de-identified] : 27 February 2017 [de-identified] : SEE PACEART

## 2021-07-07 NOTE — HISTORY OF PRESENT ILLNESS
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 A single chamber ICD was implanted in 2000 with generator change in 2/2008 and 2/2017. Throughout the years he has had VT terminated by ATP and had an appropriate ICD shock in 2010, with successful arrhythmia termination. Echo and stress test were completed. Sotalol was increased from 80 to 120mg TID. \par \par He had another ICD shock 11/25/18. Admits to some dietary noncompliance. Also concerned because he had been receiving Sotalol from a different . On 1/24/2019 the patient received another shock corresponding with VT at 188 bpm. States that he was compliant with sotalol and feeling well. He notes that seconds prior to the shock he felt some dizziness, the shock occurred and he felt better. \par \par VT ablation was planned, but first we sent the patient for an ischemic work-up with Dr. Rashid. He had a diagnostic R and L heart cath on 3/8/2019 which was c/b a L thalamic stroke requiring admission. CTA Head/Neck showed no obstruction. Given propensity for cerebral hypoperfusion with mild sedation, VT ablation was deferred. Recurrent shocks 5/24/19- MMVT that did not terminate with ATP,  9/22/19 - reports he was sitting on the couch at the time and perhaps felt a "little jolt" and 5/2020 ATP, followed by 1 shock. \par \par He presents today feeling well and endorsing no further shocks. Tolerating Eliquis without bleeding issues.  Needs screening colonoscopy with Dr. Eisenberg in the coming weeks.\par \par CT angio of coronaries on 3/1/19 no ARGELIA, no LA thrombus, Calcium score of 3866 A units, severe stenosis of LAD/D1; dense calcified plaque in prox LCX/large ramus intermedius/prox RCA, severe stenosis cannot be excluded; motion artifact in OM1, distl RCA and RPL significant stenosis cannot be excluded; wall thinning with low attenuation in the basal to apical anterior walls, basal to mid anteroseptum, consistent with MI; LVEF severely depressed with akinesis of the basal to apical anterior walls and basal to mid anteroseptum, LV dilated, LA moderately dilated. \par \par ECHO on 3/1/19 indicated severe dilated LV, severely reduced LV systolic function with EF of 25%, regional WMA, LV diastolic dysfucntion, gr II, no LV thrombi, LA mildly dilated, trace MR/TR/CA. \par

## 2021-07-12 ENCOUNTER — APPOINTMENT (OUTPATIENT)
Dept: ENDOCRINOLOGY | Facility: CLINIC | Age: 76
End: 2021-07-12
Payer: MEDICARE

## 2021-07-12 VITALS
OXYGEN SATURATION: 97 % | DIASTOLIC BLOOD PRESSURE: 64 MMHG | BODY MASS INDEX: 28.77 KG/M2 | HEIGHT: 70 IN | TEMPERATURE: 96 F | WEIGHT: 201 LBS | HEART RATE: 77 BPM | SYSTOLIC BLOOD PRESSURE: 96 MMHG

## 2021-07-12 PROCEDURE — 99214 OFFICE O/P EST MOD 30 MIN: CPT

## 2021-07-12 NOTE — ASSESSMENT
[FreeTextEntry1] : Patient is a 75 yo man with type 2 diabetes, HTN, HLD here for diabetes follow up.\par \par 1. T2DM\par -patient reported had a serum A1c in June which was 6.5%.  Labs were reviewed and scanned\par -he feels well\par -offered option of stopping trulicity and/or decreasing the dose of repaglinide but he declines any changes to medicines at this time.\par -hypoglycemia education was provided.  Educated to check sugars when feeling light headed and if sugars are < 70 to call\par -discussed that he should be checking sugar\par -requires dilated eye exam\par -continue trulicity, metformin and repaglinide at current doses\par -microalbumin/creatine levels checked (see scanned)\par -monofilament testing normal\par \par 2. HTN\par -BP goal < 140/90\par -continue cardiology care\par \par 3. HLD\par -statin\par -LDL is at goal\par -see scanned labs from June 25\par \par 4. Osteoporosis screening\par -DXA referral provided at the last visit but he lost the sheet\par -another updated referral provided today\par \par Follow up in 6 months, sooner if needed\par  [Diabetes Foot Care] : diabetes foot care [Long Term Vascular Complications] : long term vascular complications of diabetes [Carbohydrate Consistent Diet] : carbohydrate consistent diet [Importance of Diet and Exercise] : importance of diet and exercise to improve glycemic control, achieve weight loss and improve cardiovascular health [Exercise/Effect on Glucose] : exercise/effect on glucose [Hypoglycemia Management] : hypoglycemia management [Self Monitoring of Blood Glucose] : self monitoring of blood glucose [Retinopathy Screening] : Patient was referred to ophthalmology for retinopathy screening

## 2021-07-12 NOTE — PHYSICAL EXAM
[Alert] : alert [Well Nourished] : well nourished [No Acute Distress] : no acute distress [EOMI] : extra ocular movement intact [No Respiratory Distress] : no respiratory distress [No Accessory Muscle Use] : no accessory muscle use [Clear to Auscultation] : lungs were clear to auscultation bilaterally [Normal S1, S2] : normal S1 and S2 [Normal Rate] : heart rate was normal [Normal Bowel Sounds] : normal bowel sounds [Not Tender] : non-tender [Soft] : abdomen soft [Normal Gait] : normal gait [Foot Ulcers] : no foot ulcers [Right Foot Was Examined] : right foot ~C was examined [Left Foot Was Examined] : left foot ~C was examined [Full ROM] : with full range of motion [Swelling] : not swollen [Tenderness] : not tender [Erythema] : not erythematous [2+] : 2+ in the dorsalis pedis [#1 Diminished] : number 1 was normal [#2 Diminished] : number 2 was normal [#3 Diminished] : number 3 was normal [#4 Diminished] : number 4 was normal [#5 Diminished] : number 5 was normal [#6 Diminished] : number 6 was normal [#7 Diminished] : number 7 was normal [#8 Diminished] : number 8 was normal [#9 Diminished] : number 9 was normal [#10 Diminished] : number 10 was normal [No Motor Deficits] : the motor exam was normal [Normal Affect] : the affect was normal [Normal Insight/Judgement] : insight and judgment were intact [Normal Mood] : the mood was normal

## 2021-07-12 NOTE — REVIEW OF SYSTEMS
[Fatigue] : no fatigue [Decreased Appetite] : appetite not decreased [Recent Weight Gain (___ Lbs)] : no recent weight gain [Recent Weight Loss (___ Lbs)] : no recent weight loss [Visual Field Defect] : no visual field defect [Dysphagia] : no dysphagia [Dysphonia] : no dysphonia [Shortness Of Breath] : no shortness of breath [Cough] : no cough [Nausea] : no nausea [Constipation] : no constipation [Vomiting] : no vomiting

## 2021-07-12 NOTE — HISTORY OF PRESENT ILLNESS
[FreeTextEntry1] : Patient is a 75 yo man with Type 2 diabetes history of thalamic stroke, VT, HTN, HLD here for diabetes follow up\par \par The patient was diagnosed with diabetes around 2018 based on an elevated A1c. Had previously seen Dr. Byers in 2018. At the time, he was on metformin and repaglinide. October 2020, patient had an A1c of 8.5% which is high and was concerned.\par Current medications include metformin 1000 mg BID, repaglinide and trulicity\par Patient has a glucometer but does not check sugars, does not like the "ritual."\par Breakfast: cereal with almond milk and strawberries/blueberries; sometimes a bagel or half a bagel\par Lunch: eats out on occasion.  Once a week when visiting friends\par Dinner: lasagna\par No soda and no juice; tomato juice once in a while\par Stopped drinking orange juice\par Sometimes some ice cream\par Dilated eye exam: history of cataracts; last visit 1 year ago with Dr. Petty; does not have an appointment just yet but will make it. He just returned from [place]. Has an appointment with Dr. Prado retinal specialist in August\par No history of pancreatitis; no personal/family history of MEN/MTC \par

## 2021-08-10 ENCOUNTER — OUTPATIENT (OUTPATIENT)
Dept: OUTPATIENT SERVICES | Facility: HOSPITAL | Age: 76
LOS: 1 days | End: 2021-08-10

## 2021-08-10 ENCOUNTER — RESULT REVIEW (OUTPATIENT)
Age: 76
End: 2021-08-10

## 2021-08-10 ENCOUNTER — APPOINTMENT (OUTPATIENT)
Dept: RADIOLOGY | Facility: CLINIC | Age: 76
End: 2021-08-10
Payer: MEDICARE

## 2021-08-10 DIAGNOSIS — Z98.890 OTHER SPECIFIED POSTPROCEDURAL STATES: Chronic | ICD-10-CM

## 2021-08-10 DIAGNOSIS — Z95.810 PRESENCE OF AUTOMATIC (IMPLANTABLE) CARDIAC DEFIBRILLATOR: Chronic | ICD-10-CM

## 2021-08-10 PROCEDURE — 77085 DXA BONE DENSITY AXL VRT FX: CPT | Mod: 26

## 2021-09-13 ENCOUNTER — RX RENEWAL (OUTPATIENT)
Age: 76
End: 2021-09-13

## 2021-09-23 ENCOUNTER — APPOINTMENT (OUTPATIENT)
Dept: UROLOGY | Facility: CLINIC | Age: 76
End: 2021-09-23
Payer: MEDICARE

## 2021-09-23 VITALS
HEIGHT: 70 IN | SYSTOLIC BLOOD PRESSURE: 104 MMHG | BODY MASS INDEX: 28.77 KG/M2 | HEART RATE: 66 BPM | DIASTOLIC BLOOD PRESSURE: 66 MMHG | WEIGHT: 201 LBS | TEMPERATURE: 97.3 F

## 2021-09-23 PROCEDURE — 99212 OFFICE O/P EST SF 10 MIN: CPT

## 2021-09-23 NOTE — HISTORY OF PRESENT ILLNESS
[FreeTextEntry1] : 76M with hx of Ventricular tachy with AICD in situ, HTN,DM2, and ED\par here for annual follow up \par Currently using prescription for Trimix 5 units ICI\par reports trimix still working well\par denies urinary problems.

## 2021-09-23 NOTE — ASSESSMENT
[FreeTextEntry1] : 76M with hx of Ventricular tachy with AICD in situ, HTN,DM2, and ED\par \par Erectile dysfunction due to arterial insufficiency\par continue trimix 5 units ICI - prescription renewed\par no urinary complaints\par feels good\par \par follow up annually

## 2021-10-26 ENCOUNTER — NON-APPOINTMENT (OUTPATIENT)
Age: 76
End: 2021-10-26

## 2021-10-26 ENCOUNTER — APPOINTMENT (OUTPATIENT)
Dept: HEART AND VASCULAR | Facility: CLINIC | Age: 76
End: 2021-10-26
Payer: MEDICARE

## 2021-10-26 PROCEDURE — 93295 DEV INTERROG REMOTE 1/2/MLT: CPT

## 2021-10-26 PROCEDURE — 93296 REM INTERROG EVL PM/IDS: CPT

## 2021-10-27 ENCOUNTER — NON-APPOINTMENT (OUTPATIENT)
Age: 76
End: 2021-10-27

## 2021-10-28 ENCOUNTER — NON-APPOINTMENT (OUTPATIENT)
Age: 76
End: 2021-10-28

## 2021-10-29 ENCOUNTER — APPOINTMENT (OUTPATIENT)
Dept: HEART AND VASCULAR | Facility: CLINIC | Age: 76
End: 2021-10-29
Payer: MEDICARE

## 2021-10-29 ENCOUNTER — NON-APPOINTMENT (OUTPATIENT)
Age: 76
End: 2021-10-29

## 2021-10-29 VITALS
TEMPERATURE: 97.3 F | HEART RATE: 6 BPM | HEIGHT: 70 IN | SYSTOLIC BLOOD PRESSURE: 127 MMHG | DIASTOLIC BLOOD PRESSURE: 69 MMHG | WEIGHT: 210 LBS | BODY MASS INDEX: 30.06 KG/M2

## 2021-10-29 PROCEDURE — 93282 PRGRMG EVAL IMPLANTABLE DFB: CPT

## 2021-10-29 NOTE — PROCEDURE
[No] : not [NSR] : normal sinus rhythm [See Device Printout] : See device printout [ICD] : Implantable cardioverter-defibrillator [VVI] : VVI [Charge Time: ___ sec] : charge time was [unfilled] seconds [Normal] : The battery status is normal. [Threshold Testing Performed] : Threshold testing was performed [Lead Imp:  ___ohms] : lead impedance was [unfilled] ohms [Sensing Amplitude ___mv] : sensing amplitude was [unfilled] mv [___V @] : [unfilled] V [___ ms] : [unfilled] ms [None] : none [Sense ___ %] : Sense [unfilled]% [de-identified] : Medtronic [de-identified] : Etienne NOONAN VR [de-identified] : [id number] [de-identified] : 27 February 2017 [de-identified] : 42 [de-identified] : 6.4 yr [de-identified] : 10/26/21 -- MMVT  bpm, unable to terminate with ATP x 4, but successfully by 1 shock (35.8 J).  \par  < 0.1%\par

## 2021-10-29 NOTE — PHYSICAL EXAM
[General Appearance - Well Developed] : well developed [Normal Appearance] : normal appearance [Well Groomed] : well groomed [General Appearance - Well Nourished] : well nourished [No Deformities] : no deformities [General Appearance - In No Acute Distress] : no acute distress [Heart Rate And Rhythm] : heart rate and rhythm were normal [Heart Sounds] : normal S1 and S2 [Edema] : no peripheral edema present [Respiration, Rhythm And Depth] : normal respiratory rhythm and effort [Auscultation Breath Sounds / Voice Sounds] : lungs were clear to auscultation bilaterally [Left Infraclavicular] : left infraclavicular area [Clean] : clean [Dry] : dry [Well-Healed] : well-healed [Abdomen Soft] : soft [FreeTextEntry1] : No LE edema.

## 2021-10-29 NOTE — HISTORY OF PRESENT ILLNESS
[de-identified] : 76 year old man with DM, AWMI, NSVT and inducible VT at EPS. A single chamber ICD was implanted in 2000 with generator change in 2/2008 and 2/2017. Throughout the years he has had VT terminated by ATP and had an appropriate ICD shock in 2010, with successful arrhythmia termination. Echo and stress test were completed. Sotalol was increased from 80 to 120mg TID.  Echo 2019 showed EF 25%. \par \par He had another ICD shock 11/25/18.  On 1/24/2019 the patient received another shock corresponding with VT at 188 bpm. States that he was compliant with sotalol and feeling well. \par \par VT ablation was planned, but first we sent the patient for an ischemic work-up with Dr. Rashid. He had a diagnostic R and L heart cath on 3/8/2019 which was c/b a L thalamic stroke requiring admission. CTA Head/Neck showed no obstruction. Given propensity for cerebral hypoperfusion with mild sedation, VT ablation was deferred. \par \par Recurrent shocks 5/24/19- MMVT that did not terminate with ATP,  9/22/19 - reports he was sitting on the couch at the time and perhaps felt a "little jolt".   5/2020 - ATP, followed by 1 shock. \par \par He has been doing well until 10/26/21 at around 4AM when he woke up and felt some lightheadedness before ICD shock.  He didn't have CP / syncope before and after ICD shock. He felt back to normal and went to sleep.  He was notified via Urge alert and presents for ICD check.  Otherwise feels good. Able to walk a few blocks. \par \par \par \par

## 2021-11-08 NOTE — SPEECH LANGUAGE PATHOLOGY EVALUATION - CONFRONTATIONAL NAMING
[FreeTextEntry1] : 40F PMH obesity, dyslipidemia, anxiety/depression, herniated disk who presents to medical weight management for initial evaluation.\par \par # Obesity c/b dyslipidemia, anxiety/depression on SSRI, herniated disk: No recent weight measured, seems to have lost weight but has not weighed himself recently and has relapsed on some behaviors. Tolerating Phentermine well, exercise is limited, sleeping well. Diet notable for inclusion of processed carbohydrates and high fat foods. Motivated to re-start dietary changes.\par - Monitor weight\par - Replace processed carbohydrates (white rice, white bread, pasta) with whole grains or whole grain alternatives\par - Avoid fast food and high calorie density foods such as french fries, ice cream, french toast, red meat.\par - PT referral, do exercises at home, goal for eventual exercise.\par - C/w phentermine 37.5 mg/d for now.\par - Patient undergoing potential psychiatric medication changes, if he is treated for ADHD and started on stimulant than we may discontinue Phentermine. Discussed with patient who stated his psychiatrist is aware he is taking the medication.\par - C/w \par - F/u in 4-8 weeks.\par 
intact

## 2022-01-01 ENCOUNTER — RESULT REVIEW (OUTPATIENT)
Age: 77
End: 2022-01-01

## 2022-01-01 ENCOUNTER — NON-APPOINTMENT (OUTPATIENT)
Age: 77
End: 2022-01-01

## 2022-01-01 ENCOUNTER — APPOINTMENT (OUTPATIENT)
Dept: GASTROENTEROLOGY | Facility: CLINIC | Age: 77
End: 2022-01-01

## 2022-01-01 ENCOUNTER — APPOINTMENT (OUTPATIENT)
Dept: NEUROLOGY | Facility: CLINIC | Age: 77
End: 2022-01-01
Payer: MEDICARE

## 2022-01-01 ENCOUNTER — APPOINTMENT (OUTPATIENT)
Dept: ENDOCRINOLOGY | Facility: CLINIC | Age: 77
End: 2022-01-01

## 2022-01-01 ENCOUNTER — APPOINTMENT (OUTPATIENT)
Dept: CT IMAGING | Facility: CLINIC | Age: 77
End: 2022-01-01

## 2022-01-01 ENCOUNTER — FORM ENCOUNTER (OUTPATIENT)
Age: 77
End: 2022-01-01

## 2022-01-01 ENCOUNTER — APPOINTMENT (OUTPATIENT)
Dept: UROLOGY | Facility: CLINIC | Age: 77
End: 2022-01-01

## 2022-01-01 ENCOUNTER — APPOINTMENT (OUTPATIENT)
Dept: HEART AND VASCULAR | Facility: CLINIC | Age: 77
End: 2022-01-01

## 2022-01-01 ENCOUNTER — OUTPATIENT (OUTPATIENT)
Dept: OUTPATIENT SERVICES | Facility: HOSPITAL | Age: 77
LOS: 1 days | End: 2022-01-01

## 2022-01-01 ENCOUNTER — APPOINTMENT (OUTPATIENT)
Dept: HEART AND VASCULAR | Facility: CLINIC | Age: 77
End: 2022-01-01
Payer: MEDICARE

## 2022-01-01 VITALS
WEIGHT: 206 LBS | BODY MASS INDEX: 28.84 KG/M2 | RESPIRATION RATE: 14 BRPM | OXYGEN SATURATION: 98 % | DIASTOLIC BLOOD PRESSURE: 73 MMHG | HEIGHT: 71 IN | SYSTOLIC BLOOD PRESSURE: 126 MMHG | HEART RATE: 70 BPM | TEMPERATURE: 96.9 F

## 2022-01-01 VITALS — TEMPERATURE: 96.8 F | SYSTOLIC BLOOD PRESSURE: 105 MMHG | DIASTOLIC BLOOD PRESSURE: 67 MMHG | HEART RATE: 71 BPM

## 2022-01-01 VITALS
HEART RATE: 65 BPM | DIASTOLIC BLOOD PRESSURE: 60 MMHG | BODY MASS INDEX: 28.84 KG/M2 | HEIGHT: 71 IN | SYSTOLIC BLOOD PRESSURE: 119 MMHG | WEIGHT: 206 LBS

## 2022-01-01 VITALS
SYSTOLIC BLOOD PRESSURE: 104 MMHG | OXYGEN SATURATION: 97 % | HEART RATE: 74 BPM | DIASTOLIC BLOOD PRESSURE: 65 MMHG | BODY MASS INDEX: 28.98 KG/M2 | HEIGHT: 71 IN | TEMPERATURE: 96.1 F | WEIGHT: 207 LBS

## 2022-01-01 VITALS
HEIGHT: 71 IN | BODY MASS INDEX: 28.28 KG/M2 | SYSTOLIC BLOOD PRESSURE: 91 MMHG | OXYGEN SATURATION: 98 % | DIASTOLIC BLOOD PRESSURE: 60 MMHG | HEART RATE: 71 BPM | WEIGHT: 202 LBS | TEMPERATURE: 95.9 F

## 2022-01-01 VITALS — SYSTOLIC BLOOD PRESSURE: 110 MMHG | DIASTOLIC BLOOD PRESSURE: 62 MMHG | TEMPERATURE: 97 F

## 2022-01-01 DIAGNOSIS — D12.6 BENIGN NEOPLASM OF COLON, UNSPECIFIED: ICD-10-CM

## 2022-01-01 DIAGNOSIS — Z98.890 OTHER SPECIFIED POSTPROCEDURAL STATES: Chronic | ICD-10-CM

## 2022-01-01 DIAGNOSIS — Z95.810 PRESENCE OF AUTOMATIC (IMPLANTABLE) CARDIAC DEFIBRILLATOR: ICD-10-CM

## 2022-01-01 DIAGNOSIS — I47.2 VENTRICULAR TACHYCARDIA: ICD-10-CM

## 2022-01-01 DIAGNOSIS — Z95.810 PRESENCE OF AUTOMATIC (IMPLANTABLE) CARDIAC DEFIBRILLATOR: Chronic | ICD-10-CM

## 2022-01-01 DIAGNOSIS — E11.9 TYPE 2 DIABETES MELLITUS W/OUT COMPLICATIONS: ICD-10-CM

## 2022-01-01 DIAGNOSIS — I10 ESSENTIAL (PRIMARY) HYPERTENSION: ICD-10-CM

## 2022-01-01 DIAGNOSIS — E78.5 HYPERLIPIDEMIA, UNSPECIFIED: ICD-10-CM

## 2022-01-01 DIAGNOSIS — N52.01 ERECTILE DYSFUNCTION DUE TO ARTERIAL INSUFFICIENCY: ICD-10-CM

## 2022-01-01 LAB
APPEARANCE: CLEAR
BACTERIA UR CULT: NORMAL
BACTERIA: NEGATIVE
BILIRUBIN URINE: NEGATIVE
BLOOD URINE: NEGATIVE
COLOR: YELLOW
GLUCOSE QUALITATIVE U: NEGATIVE
HYALINE CASTS: 2 /LPF
KETONES URINE: NEGATIVE
LEUKOCYTE ESTERASE URINE: NEGATIVE
MICROSCOPIC-UA: NORMAL
NITRITE URINE: NEGATIVE
PH URINE: 6
PROTEIN URINE: ABNORMAL
RED BLOOD CELLS URINE: 3 /HPF
SPECIFIC GRAVITY URINE: 1.02
SQUAMOUS EPITHELIAL CELLS: 0 /HPF
UROBILINOGEN URINE: NORMAL
WHITE BLOOD CELLS URINE: 3 /HPF

## 2022-01-01 PROCEDURE — 74263 CT COLONOGRAPHY SCREENING: CPT | Mod: 26

## 2022-01-01 PROCEDURE — 99213 OFFICE O/P EST LOW 20 MIN: CPT | Mod: 25

## 2022-01-01 PROCEDURE — 93880 EXTRACRANIAL BILAT STUDY: CPT

## 2022-01-01 PROCEDURE — 99214 OFFICE O/P EST MOD 30 MIN: CPT

## 2022-01-01 PROCEDURE — 93282 PRGRMG EVAL IMPLANTABLE DFB: CPT

## 2022-01-01 PROCEDURE — 99204 OFFICE O/P NEW MOD 45 MIN: CPT

## 2022-01-01 PROCEDURE — 99213 OFFICE O/P EST LOW 20 MIN: CPT

## 2022-01-01 RX ORDER — DULAGLUTIDE 0.75 MG/.5ML
0.75 INJECTION, SOLUTION SUBCUTANEOUS
Qty: 3 | Refills: 3 | Status: ACTIVE | COMMUNITY
Start: 2020-10-12 | End: 1900-01-01

## 2022-01-01 RX ORDER — EMTRICITABINE AND TENOFOVIR ALAFENAMIDE 200; 25 MG/1; MG/1
200-25 TABLET ORAL
Qty: 30 | Refills: 0 | Status: ACTIVE | COMMUNITY
Start: 2022-01-01

## 2022-01-01 RX ORDER — METOPROLOL SUCCINATE 25 MG/1
25 TABLET, EXTENDED RELEASE ORAL
Qty: 180 | Refills: 2 | Status: ACTIVE | COMMUNITY
Start: 2019-09-24 | End: 1900-01-01

## 2022-01-01 RX ORDER — PAPAVERINE HYDROCHLORIDE 30 MG/ML
30 INJECTION, SOLUTION INTRAVENOUS
Qty: 5 | Refills: 11 | Status: ACTIVE | COMMUNITY
Start: 2020-11-12 | End: 1900-01-01

## 2022-01-01 RX ORDER — EMTRICITABINE AND TENOFOVIR DISOPROXIL FUMARATE 200; 300 MG/1; MG/1
200-300 TABLET, FILM COATED ORAL DAILY
Refills: 0 | Status: DISCONTINUED | COMMUNITY
End: 2022-01-01

## 2022-01-01 RX ORDER — BLOOD SUGAR DIAGNOSTIC
29G X 1/2" STRIP MISCELLANEOUS
Qty: 30 | Refills: 11 | Status: ACTIVE | COMMUNITY
Start: 2020-11-12 | End: 1900-01-01

## 2022-01-10 ENCOUNTER — APPOINTMENT (OUTPATIENT)
Dept: ENDOCRINOLOGY | Facility: CLINIC | Age: 77
End: 2022-01-10
Payer: MEDICARE

## 2022-01-10 VITALS
TEMPERATURE: 97.5 F | BODY MASS INDEX: 29.12 KG/M2 | HEART RATE: 71 BPM | OXYGEN SATURATION: 97 % | HEIGHT: 71 IN | SYSTOLIC BLOOD PRESSURE: 117 MMHG | WEIGHT: 208 LBS | DIASTOLIC BLOOD PRESSURE: 79 MMHG

## 2022-01-10 PROCEDURE — 99214 OFFICE O/P EST MOD 30 MIN: CPT

## 2022-01-10 NOTE — PHYSICAL EXAM
[Alert] : alert [Well Nourished] : well nourished [Healthy Appearance] : healthy appearance [No Acute Distress] : no acute distress [EOMI] : extra ocular movement intact [Normal Hearing] : hearing was normal [No Respiratory Distress] : no respiratory distress [No Accessory Muscle Use] : no accessory muscle use [Clear to Auscultation] : lungs were clear to auscultation bilaterally [Normal S1, S2] : normal S1 and S2 [Normal Rate] : heart rate was normal [Normal Bowel Sounds] : normal bowel sounds [Not Tender] : non-tender [Soft] : abdomen soft [Normal Gait] : normal gait [Full ROM] : with full range of motion [2+] : 2+ in the dorsalis pedis [No Motor Deficits] : the motor exam was normal [Normal Affect] : the affect was normal [Normal Insight/Judgement] : insight and judgment were intact [Normal Mood] : the mood was normal [Swelling] : not swollen [Tenderness] : not tender [Erythema] : not erythematous [#1 Diminished] : number 1 was normal [#2 Diminished] : number 2 was normal [#3 Diminished] : number 3 was normal [#4 Diminished] : number 4 was normal [#5 Diminished] : number 5 was normal [#6 Diminished] : number 6 was normal [#7 Diminished] : number 7 was normal [#8 Diminished] : number 8 was normal [#9 Diminished] : number 9 was normal [#10 Diminished] : number 10 was normal

## 2022-01-10 NOTE — REVIEW OF SYSTEMS
[Fatigue] : no fatigue [Decreased Appetite] : appetite not decreased [Dysphagia] : no dysphagia [Dysphonia] : no dysphonia [Chest Pain] : no chest pain [Palpitations] : no palpitations [Shortness Of Breath] : no shortness of breath [Nausea] : no nausea [Constipation] : no constipation [Vomiting] : no vomiting [Diarrhea] : no diarrhea [Headaches] : no headaches [Depression] : no depression [Cold Intolerance] : no cold intolerance [Heat Intolerance] : no heat intolerance

## 2022-01-10 NOTE — ASSESSMENT
[Diabetes Foot Care] : diabetes foot care [Long Term Vascular Complications] : long term vascular complications of diabetes [Carbohydrate Consistent Diet] : carbohydrate consistent diet [Importance of Diet and Exercise] : importance of diet and exercise to improve glycemic control, achieve weight loss and improve cardiovascular health [Exercise/Effect on Glucose] : exercise/effect on glucose [Self Monitoring of Blood Glucose] : self monitoring of blood glucose [Retinopathy Screening] : Patient was referred to ophthalmology for retinopathy screening [Weight Loss] : weight loss [FreeTextEntry1] : Patient is a 77 yo man with type 2 diabetes, HTN and HLD here for diabetes follow up\par \par 1. T2DM\par -patient returned to [facility] from [place] for his annual doctor's appointments\par -he denies interval symptoms and has been in good health\par -on diet recall food is fairly liberal without significant lifestyle modifications for diabetes-\par -serum A1c 12/21/21 is at goal of 7.1%\par -microalbumin also done and no nephropathy noted \par -labs from outside facility were reviewed and scanned in chart\par -he is up to date with dilated eye exam\par -continues on metformin, repaglinide and trulicity without reported side effects. Continue with current medicines. Refill of Trulicity provided as requested\par -up to date with dilated eye exam\par -encouraged consistent carbohydrate/ sugar limited diet\par \par 2. HTN\par -BP goal <140/90\par -states he has follow up with cardiology tomorrow\par \par 3. HLD\par -lipid profile was not checked on recent labs, but he has been told it's been good\par -he is on statin and zetia\par \par Follow up in six months, sooner if needed\par \par

## 2022-01-11 ENCOUNTER — APPOINTMENT (OUTPATIENT)
Dept: HEART AND VASCULAR | Facility: CLINIC | Age: 77
End: 2022-01-11
Payer: MEDICARE

## 2022-01-11 VITALS
BODY MASS INDEX: 29.12 KG/M2 | HEART RATE: 72 BPM | HEIGHT: 71 IN | WEIGHT: 208 LBS | DIASTOLIC BLOOD PRESSURE: 62 MMHG | SYSTOLIC BLOOD PRESSURE: 115 MMHG | TEMPERATURE: 97.6 F

## 2022-01-11 PROCEDURE — 93282 PRGRMG EVAL IMPLANTABLE DFB: CPT

## 2022-01-11 PROCEDURE — 99214 OFFICE O/P EST MOD 30 MIN: CPT | Mod: 25

## 2022-01-11 NOTE — PROCEDURE
[No] : not [NSR] : normal sinus rhythm [See Device Printout] : See device printout [ICD] : Implantable cardioverter-defibrillator [VVI] : VVI [Charge Time: ___ sec] : charge time was [unfilled] seconds [Normal] : The battery status is normal. [Threshold Testing Performed] : Threshold testing was performed [Lead Imp:  ___ohms] : lead impedance was [unfilled] ohms [Sensing Amplitude ___mv] : sensing amplitude was [unfilled] mv [___V @] : [unfilled] V [___ ms] : [unfilled] ms [None] : none [Sense ___ %] : Sense [unfilled]% [de-identified] : Medtronic [de-identified] : Etienne NOONAN VR [de-identified] : [id number] [de-identified] : 27 February 2017 [de-identified] : 42 [de-identified] : 6.3 yr [de-identified] : No events

## 2022-01-11 NOTE — HISTORY OF PRESENT ILLNESS
[de-identified] : 76 year old man with DM, AWMI, NSVT and inducible VT at EPS. A single chamber ICD was implanted in 2000 with generator change in 2/2008 and 2/2017. Throughout the years he has had VT terminated by ATP and had an appropriate ICD shock in 2010, with successful arrhythmia termination. Echo and stress test were completed. Sotalol was increased from 80 to 120mg TID.  Echo 2019 showed EF 25%. \par \par He had another ICD shock 11/25/18.  On 1/24/2019 the patient received another shock corresponding with VT at 188 bpm. States that he was compliant with sotalol and feeling well. \par \par VT ablation was planned, but first we sent the patient for an ischemic work-up with Dr. Rashid. He had a diagnostic R and L heart cath on 3/8/2019 which was c/b a L thalamic stroke requiring admission. CTA Head/Neck showed no obstruction. Given propensity for cerebral hypoperfusion with mild sedation, VT ablation was deferred. \par \par Recurrent shocks 5/24/19- MMVT that did not terminate with ATP,  9/22/19 - reports he was sitting on the couch at the time and perhaps felt a "little jolt".   5/2020 - ATP, followed by 1 shock.  10/26/21 MMVT  bpm that failed ATP x 4, converted to SR with 35.8J shock.  He denies any recurrent therapies and offers no acute complaints today.  He is going back to his home in [place] tomorrow.\par \par ECG 12/21/21 reviewed QTc 437 ms. Labs 12/21/21 Cr 0.8

## 2022-02-08 PROBLEM — I63.9 THALAMIC STROKE: Status: ACTIVE | Noted: 2019-03-28

## 2022-02-09 ENCOUNTER — APPOINTMENT (OUTPATIENT)
Dept: NEUROLOGY | Facility: CLINIC | Age: 77
End: 2022-02-09
Payer: MEDICARE

## 2022-02-09 VITALS — SYSTOLIC BLOOD PRESSURE: 98 MMHG | DIASTOLIC BLOOD PRESSURE: 70 MMHG

## 2022-02-09 VITALS
HEART RATE: 74 BPM | BODY MASS INDEX: 29.12 KG/M2 | OXYGEN SATURATION: 95 % | TEMPERATURE: 96.6 F | DIASTOLIC BLOOD PRESSURE: 62 MMHG | HEIGHT: 71 IN | WEIGHT: 208 LBS | SYSTOLIC BLOOD PRESSURE: 92 MMHG

## 2022-02-09 DIAGNOSIS — I63.9 CEREBRAL INFARCTION, UNSPECIFIED: ICD-10-CM

## 2022-02-09 PROCEDURE — 99214 OFFICE O/P EST MOD 30 MIN: CPT

## 2022-03-07 ENCOUNTER — RX RENEWAL (OUTPATIENT)
Age: 77
End: 2022-03-07

## 2022-04-01 NOTE — ASSESSMENT
[FreeTextEntry1] :  F/u of thalamic stroke, doing well \par \par -Carotid Doppler for routine\par Continue eliquis 5mg BID for vtach, secondary stroke prevention. Monitor for any blood in urine or stool\par Continue Atorvastatin 20mg daily for secondary stroke prevention. Monitor for any cramps or muscle pain. Goal LDL <70- send us results from PCP \par Vtach s/p AICD placement- Defer to EP/ cards for monitoring/ management \par DM- c/w current regimen, defer to endo for management, goal A1c <7.0% \par HLD- discussed above \par HTN- continue with current regimen. Continue to take BPs regularly at home and keep a log, goal BP <140/80. Monitor for consistently low BPs, especially symptomatic with associated lightheadedness. For now given his cardiac history ok for it to be on lower end... \par -Counselled on healthy eating (DASH/ Mediterranean diet, limiting red meats)\par -Counselled on importance of regular exercise and remaining active\par -Continue to f/u with PCP regarding regular health maintenance and prevention, including routine screening \par \par -Counselled on signs of stroke BEFAST and to call 911 with any new or worsening neurological symptoms \par

## 2022-04-01 NOTE — HISTORY OF PRESENT ILLNESS
[FreeTextEntry1] : Pt is 76yoM with PMH thalamic stroke, DM, AWMI, NSVT, AICD, here for routine followuo. \par \par DM- metformin 1000 mg BID, repaglinide and trulicity, a1C AT , endo is Dr. Rashid \par HLD- on setia and statin, last LDL_? Will be getting LDL rechecked with his PCP soon \par BP- 92/ 62, entresto 49-51 BID. Denies any lightheadedness/ dizziness. \par \par He denies any new or worsening specific neurological complaints including headache/ dizziness/ vision changes/ speech impairment/ focal or generalized weakness/ sensation changes/ gait disturbance/ tremor. \par  \par No residual deficits from stroke\par He does not need any refills today \par On eliquis 5mg BID- no bleeding in urine/ stool \par _____________________________\par HPI: 74 year old male patient with a past medical history of thalamic stroke and ventricular tachycardia presents for follow up visit. \par \par Reports his defibrillator in May and September; recently saw Dr. Boyle in September where he was put on metoprolol and then followed up again on November 8. \par \par Reports daily adherence to eliquis 5mg; no blood in urine and stool. Continues to take lipitor 20mg; no muscle cramps or pain. \par \par No headaches, weakness, speech deficits, etc. \par No language issues.\par \par Has f/u with PCP tomorrow- already did blood work the week prior.

## 2022-04-01 NOTE — PHYSICAL EXAM
[FreeTextEntry1] : The patient is alert and oriented x3, naming intact x3, repetition normal, follows three-step commands, and is able to participate fully in the history taking.\par Speech is normal with no evidence of dysarthria.\par \par Memory is intact: Immediate recall 3 out of 3, short-term 3 out of 3, remote memory intact\par \par Cranial nerves II through XII - intact \par \par Motor exam: Upper and lower extremities grossly normal power, No abnormal movements noted.\par \par Coordination and vestibular exam: Finger to nose intact, no evidence of truncal or appendicular ataxia. No evidence of nystagmus. \par \par Gait: Normal stance and gait.

## 2022-04-05 NOTE — DISCUSSION/SUMMARY
[Routine Follow-up in 3-4 months] : routine follow-up in 3-4 months [AICD Function Normal] : normal AICD function [de-identified] : RTO in 3 months.

## 2022-04-05 NOTE — PROCEDURE
[No] : not [NSR] : normal sinus rhythm [See Device Printout] : See device printout [ICD] : Implantable cardioverter-defibrillator [VVI] : VVI [Charge Time: ___ sec] : charge time was [unfilled] seconds [Normal] : The battery status is normal. [Threshold Testing Performed] : Threshold testing was performed [___ ms] : [unfilled] ms [None] : none [Sense ___ %] : Sense [unfilled]% [Lead Imp:  ___ohms] : lead impedance was [unfilled] ohms [Sensing Amplitude ___mv] : sensing amplitude was [unfilled] mv [___V @] : [unfilled] V [de-identified] : Medtronic [de-identified] : Etienne NOONAN VR [de-identified] : [id number] [de-identified] : 27 February 2017 [de-identified] : 42 [de-identified] : 5.6 years  [de-identified] : 2xATP and 1 shock 35J for FVT\par No NSVT\par %\par Optvol not crossed

## 2022-04-05 NOTE — HISTORY OF PRESENT ILLNESS
[de-identified] : 76 year old man with DM, AWMI, NSVT and inducible VT at EPS. A single chamber ICD was implanted in 2000 with generator change in 2/2008 and 2/2017. Throughout the years he has had VT terminated by ATP and had an appropriate ICD shock in 2010, with successful arrhythmia termination. Echo and stress test were completed. Sotalol was increased from 80 to 120mg TID.  Echo 2019 showed EF 25%. \par \par He had another ICD shock 11/25/18.  On 1/24/2019 the patient received another shock corresponding with VT at 188 bpm. States that he was compliant with sotalol and feeling well. \par \par VT ablation was planned, but first we sent the patient for an ischemic work-up with Dr. Rashid. He had a diagnostic R and L heart cath on 3/8/2019 which was c/b a L thalamic stroke requiring admission. CTA Head/Neck showed no obstruction. Given propensity for cerebral hypoperfusion with mild sedation, VT ablation was deferred. \par \par Recurrent shocks 5/24/19- MMVT that did not terminate with ATP,  9/22/19 - reports he was sitting on the couch at the time and perhaps felt a "little jolt".   5/2020 - ATP, followed by 1 shock.  10/26/21 MMVT  bpm that failed ATP x 4, converted to SR with 35.8J shock. On 3/25/2022 he woke to his device alarming due to a shock that occurred during sleep. Remote transmission revealed a PVC-induced episode of VT at 2017 bpm that dailed ATPx2 and converted to SR with one 35J shock. He feels well, flew back to [place] from [place] as scheduled and presents today for follow-up.\par \par ECG 12/21/21 reviewed QTc 437 ms. Labs 12/21/21 Cr 0.8

## 2022-07-07 NOTE — REVIEW OF SYSTEMS
[Fatigue] : no fatigue [Decreased Appetite] : appetite not decreased [Visual Field Defect] : no visual field defect [Dysphagia] : no dysphagia [Dysphonia] : no dysphonia [Chest Pain] : no chest pain [Slow Heart Rate] : heart rate is not slow [Palpitations] : no palpitations [Fast Heart Rate] : heart rate is not fast [Nausea] : no nausea [Constipation] : no constipation [Vomiting] : no vomiting [Diarrhea] : no diarrhea [Depression] : no depression

## 2022-07-07 NOTE — PHYSICAL EXAM
[Alert] : alert [Well Nourished] : well nourished [Healthy Appearance] : healthy appearance [No Acute Distress] : no acute distress [EOMI] : extra ocular movement intact [Normal Hearing] : hearing was normal [No Respiratory Distress] : no respiratory distress [No Accessory Muscle Use] : no accessory muscle use [Clear to Auscultation] : lungs were clear to auscultation bilaterally [Normal S1, S2] : normal S1 and S2 [Normal Rate] : heart rate was normal [Normal Bowel Sounds] : normal bowel sounds [Not Tender] : non-tender [Soft] : abdomen soft [Normal Gait] : normal gait [No Motor Deficits] : the motor exam was normal [Normal Affect] : the affect was normal [Normal Insight/Judgement] : insight and judgment were intact [Normal Mood] : the mood was normal

## 2022-07-07 NOTE — HISTORY OF PRESENT ILLNESS
[FreeTextEntry1] : Patient is a 76 yo man with Type 2 diabetes history of thalamic stroke, VT, HTN, HLD here for diabetes follow up\par \par The patient was diagnosed with diabetes around 2018 based on an elevated A1c. Had previously seen Dr. Byers in 2018. At the time, he was on metformin and repaglinide. October 2020, patient had an A1c of 8.5% which is high and was concerned.\par Current medications include metformin 1000 mg BID, repaglinide and trulicity\par Patient has a glucometer but does not check sugars, does not like the "ritual."\par Breakfast: eats a bagel frequently; cereal with almond milk (Cheerios, shredded wheat); strawberries/blueberries\par Lunch: eats out on occasion. \par Dinner: lasagna with meat sauce\par No soda and no juice; tomato juice once in a while\par Patient has cut back on snacking, has not had ice cream regularly just over the holidays. Occasional cookies. \par Dilated eye exam: history of cataracts; dilated eye exam Dr. Schultz, no reported diabetes related complications\par No history of pancreatitis; no personal/family history of MEN/MTC

## 2022-07-07 NOTE — ASSESSMENT
[FreeTextEntry1] : Patient is a 76 yo man with type 2 diabetes, HTN and HLD here for diabetes follow up\par \par 1. T2DM\par -he denies interval symptoms and has been in good health\par -on diet recall food is fairly liberal without significant lifestyle modifications for diabetes. He does not anticipate changing his diet any time soon\par -serum A1c 6/7/22 is at goal of 6.7% without symptoms of hypoglycemia\par -microalbumin also done and mild nephropathy noted \par -labs from outside facility were reviewed and scanned in chart\par -he is up to date with dilated eye exam\par -continues on metformin, repaglinide and trulicity without reported side effects. Continue with current medicines. Refill of Trulicity provided as requested\par -encouraged consistent carbohydrate/ sugar limited diet\par \par 2. HTN\par -BP goal <140/90\par -states he has follow up with cardiology tomorrow\par \par 3. HLD\par -he is on statin and zetia\par \par Follow up in 5-6 months, sooner if needed\par \par  [Diabetes Foot Care] : diabetes foot care [Long Term Vascular Complications] : long term vascular complications of diabetes [Carbohydrate Consistent Diet] : carbohydrate consistent diet [Importance of Diet and Exercise] : importance of diet and exercise to improve glycemic control, achieve weight loss and improve cardiovascular health [Exercise/Effect on Glucose] : exercise/effect on glucose [Retinopathy Screening] : Patient was referred to ophthalmology for retinopathy screening

## 2022-08-16 NOTE — PROCEDURE
[No] : not [NSR] : normal sinus rhythm [See Device Printout] : See device printout [ICD] : Implantable cardioverter-defibrillator [VVI] : VVI [Charge Time: ___ sec] : charge time was [unfilled] seconds [Normal] : The battery status is normal. [Threshold Testing Performed] : Threshold testing was performed [Lead Imp:  ___ohms] : lead impedance was [unfilled] ohms [Sensing Amplitude ___mv] : sensing amplitude was [unfilled] mv [___V @] : [unfilled] V [___ ms] : [unfilled] ms [None] : none [Sense ___ %] : Sense [unfilled]% [de-identified] : Medtronic [de-identified] : Etienne NOONAN VR [de-identified] : [id number] [de-identified] : 27 February 2017 [de-identified] : 42 [de-identified] : 5.6 years  [de-identified] : 1 FVT event 4/21/22 - cycle length 330 ms - successfully treated with 1 round ATP\par %\par Optvol not crossed

## 2022-08-16 NOTE — HISTORY OF PRESENT ILLNESS
[de-identified] : 76 year old man with DM, AWMI, NSVT and inducible VT at EPS. A single chamber ICD was implanted in 2000 with generator change in 2/2008 and 2/2017. Throughout the years he has had VT terminated by ATP and had an appropriate ICD shock in 2010, with successful arrhythmia termination. Echo and stress test were completed. Sotalol was increased from 80 to 120mg TID.  Echo 2019 showed EF 25%. \par \par He had another ICD shock 11/25/18.  On 1/24/2019 the patient received another shock corresponding with VT at 188 bpm. States that he was compliant with sotalol and feeling well. \par \par VT ablation was planned, but first we sent the patient for an ischemic work-up with Dr. Rashid. He had a diagnostic R and L heart cath on 3/8/2019 which was c/b a L thalamic stroke requiring admission. CTA Head/Neck showed no obstruction. Given propensity for cerebral hypoperfusion with mild sedation, VT ablation was deferred. \par \par Recurrent shocks 5/24/19- MMVT that did not terminate with ATP,  9/22/19 - reports he was sitting on the couch at the time and perhaps felt a "little jolt".   5/2020 - ATP, followed by 1 shock.  10/26/21 MMVT  bpm that failed ATP x 4, converted to SR with 35.8J shock. On 3/25/2022 he woke to his device alarming due to a shock that occurred during sleep. Remote transmission revealed a PVC-induced episode of VT at 2017 bpm that dailed ATPx2 and converted to SR with one 35J shock. Not aware of any recurrent events.  \par \par Planning to undergo screening colonoscopy in the coming weeks with Dr. Eisenberg.\par \par ECG 12/21/21 reviewed QTc 437 ms. Labs 6/2022 Cr 1

## 2022-08-16 NOTE — PROCEDURE
[No] : not [NSR] : normal sinus rhythm [See Device Printout] : See device printout [ICD] : Implantable cardioverter-defibrillator [VVI] : VVI [Charge Time: ___ sec] : charge time was [unfilled] seconds [Normal] : The battery status is normal. [Threshold Testing Performed] : Threshold testing was performed [Lead Imp:  ___ohms] : lead impedance was [unfilled] ohms [Sensing Amplitude ___mv] : sensing amplitude was [unfilled] mv [___V @] : [unfilled] V [___ ms] : [unfilled] ms [None] : none [Sense ___ %] : Sense [unfilled]% [de-identified] : [employer] [de-identified] : Etienne NOONAN VR [de-identified] : [id number] [de-identified] : 27 February 2017 [de-identified] : 42 [de-identified] : 5.6 years  [de-identified] : 1 FVT event 4/21/22 - cycle length 330 ms - successfully treated with 1 round ATP\par %\par Optvol not crossed

## 2022-08-16 NOTE — DISCUSSION/SUMMARY
[AICD Function Normal] : normal AICD function [Routine Follow-up in 3-4 months] : routine follow-up in 3-4 months [de-identified] : RTO in 3 months.

## 2022-08-16 NOTE — DISCUSSION/SUMMARY
[AICD Function Normal] : normal AICD function [Routine Follow-up in 3-4 months] : routine follow-up in 3-4 months [de-identified] : RTO in 3 months.

## 2022-10-10 PROBLEM — D12.6 TUBULAR ADENOMA OF COLON: Status: ACTIVE | Noted: 2022-01-01

## 2022-10-10 NOTE — PHYSICAL EXAM
[No Respiratory Distress] : no respiratory distress [Normal] : normal bowel sounds, non-tender, no masses, soft, no no hepato-splenomegaly

## 2022-10-10 NOTE — HISTORY OF PRESENT ILLNESS
[FreeTextEntry1] : JENNIFER ANDREWS is a 77 year M here to discuss surveillance colonoscopy. He has a history of tubular adenoma, VT s/p ICD, DMII, stroke, CAD, hx CCY and shoulder surgery. \par \par GI History: Pt has hx of tubular adenoma (5 and 7 mm) in 2002, 2004. In 2006 had no polys, and in 2011 had a hyperplastic polyp.  \par \par Current symptoms: Pt denies any GI symptoms. No diarrhea or constipation, no bleeding.\par \par GI ROS negative for weight loss, fevers/chills, dysphagia, reflux, abdominal pain, bloating, nausea, vomiting, early satiety, diarrhea, constipation, melena, hematochezia. Patient denies NSAID use. Patient denies known family history of GI cancers.\par \par Current Meds: Reviewed\par All: Reviewed\par FHx: No GI Cancers\par \par Relevant Exam:\par Well appearing\par \par Labs:\par CBC w/o anemia\par \par Endoscopy:\par Colonoscopies reviewed as above\par \par Pathology:\par Reviewed as above

## 2022-10-14 PROBLEM — N52.01 ERECTILE DYSFUNCTION DUE TO ARTERIAL INSUFFICIENCY: Status: ACTIVE | Noted: 2020-09-25

## 2022-10-14 NOTE — HISTORY OF PRESENT ILLNESS
[FreeTextEntry1] : ED using trimix 5\par no pain\par no curvaturei\par no hematurai\par no dysuria\par here for refill\par happy with meds\par

## 2022-12-21 PROBLEM — I10 HYPERTENSION, UNSPECIFIED TYPE: Status: ACTIVE | Noted: 2020-10-12

## 2022-12-21 PROBLEM — E78.5 HYPERLIPIDEMIA LDL GOAL <100: Status: ACTIVE | Noted: 2020-10-12

## 2022-12-21 NOTE — ASSESSMENT
[Long Term Vascular Complications] : long term vascular complications of diabetes [Carbohydrate Consistent Diet] : carbohydrate consistent diet [Importance of Diet and Exercise] : importance of diet and exercise to improve glycemic control, achieve weight loss and improve cardiovascular health [Self Monitoring of Blood Glucose] : self monitoring of blood glucose [Retinopathy Screening] : Patient was referred to ophthalmology for retinopathy screening [FreeTextEntry1] : Patient is a 76 yo man with type 2 diabetes, HTN and HLD here for diabetes follow up\par \par 1. T2DM\par -he denies interval symptoms and has been in good health\par -on diet recall food is fairly liberal without significant lifestyle modifications for diabetes. He does not anticipate changing his diet any time soon\par -serum A1c November 20222 is at goal of 6.4% without symptoms of hypoglycemia\par -microalbumin also done and mild nephropathy noted \par -labs from outside facility were reviewed and scanned in chart\par -he is up to date with dilated eye exam\par -continues on metformin, decrease repaglinide to 1mg TID with meals based on tight control. Continue trulicity as he is without reported side effects.\par -encouraged consistent carbohydrate/ sugar limited diet\par \par 2. HTN\par -BP goal <140/90\par \par 3. HLD\par -he is on statin and zetia\par \par Follow up in 5-6 months, sooner if needed\par

## 2022-12-21 NOTE — HISTORY OF PRESENT ILLNESS
[FreeTextEntry1] : Patient is a 76 yo man with Type 2 diabetes history of thalamic stroke, VT, HTN, HLD here for diabetes follow up\par \par The patient was diagnosed with diabetes around 2018 based on an elevated A1c. Previous endocrinologist was Dr. Byers in 2018. At the time, he was on metformin and repaglinide. October 2020, patient had an A1c of 8.5% which is high and was concerned.\par Current medications include metformin 1000 mg BID, repaglinide and trulicity\par Patient has a glucometer but does not check sugars, does not like the "ritual."\par Breakfast: shredded wheat with almond wheat and strawberries\par Lunch: eats out on occasion. \par Dinner: chicken sandwich \par Snacks: fruits\par No soda and no juice; tomato juice once in a while\par Dilated eye exam: history of cataracts; dilated eye exam Dr. Schultz, no reported diabetes related complications. Most recent visit was November 28, 2022\par No history of pancreatitis; no personal/family history of MEN/MTC

## 2022-12-21 NOTE — REVIEW OF SYSTEMS
[Fatigue] : no fatigue [Decreased Appetite] : appetite not decreased [Dysphagia] : no dysphagia [Dysphonia] : no dysphonia [Chest Pain] : no chest pain [Slow Heart Rate] : heart rate is not slow [Fast Heart Rate] : heart rate is not fast [Shortness Of Breath] : no shortness of breath [Cough] : no cough [Nausea] : no nausea [Vomiting] : no vomiting [Depression] : no depression [Cold Intolerance] : no cold intolerance

## 2023-01-01 ENCOUNTER — NON-APPOINTMENT (OUTPATIENT)
Age: 78
End: 2023-01-01

## 2023-01-01 ENCOUNTER — APPOINTMENT (OUTPATIENT)
Dept: HEART AND VASCULAR | Facility: CLINIC | Age: 78
End: 2023-01-01

## 2023-01-01 ENCOUNTER — RX RENEWAL (OUTPATIENT)
Age: 78
End: 2023-01-01

## 2023-01-01 NOTE — PATIENT PROFILE ADULT - HAS THE PATIENT BEEN ADMITTED FROM SHORT TERM REHAB?
[Today's Date] : [unfilled] [Name] : Name: [unfilled] [] : : ~~ [Today's Date:] : [unfilled] [Dear  ___:] : Dear Dr. [unfilled]: [Consult] : I had the pleasure of evaluating your patient, [unfilled]. My full evaluation follows. [Consult - Single Provider] : Thank you very much for allowing me to participate in the care of this patient. If you have any questions, please do not hesitate to contact me. [Sincerely,] : Sincerely, [FreeTextEntry4] : Dr. Esparza [FreeTextEntry5] : [address] [FreeTextEntry6] : [place] [FreeTextEntry7] : [place] [address] [de-identified] : Marga Sanderson MD, MSc\par Pediatric cardiologist\par [facility] [FreeTextEngpr5] : Telephone #[phone] no

## 2023-04-03 ENCOUNTER — FORM ENCOUNTER (OUTPATIENT)
Age: 78
End: 2023-04-03

## 2023-04-17 ENCOUNTER — APPOINTMENT (OUTPATIENT)
Dept: HEART AND VASCULAR | Facility: CLINIC | Age: 78
End: 2023-04-17

## 2023-06-28 ENCOUNTER — APPOINTMENT (OUTPATIENT)
Dept: ENDOCRINOLOGY | Facility: CLINIC | Age: 78
End: 2023-06-28

## 2025-04-14 NOTE — HISTORY OF PRESENT ILLNESS
Update History & Physical    The patient's History and Physical  was reviewed with the patient and I examined the patient. There was no change. The surgical site was confirmed by the patient and me.       Plan: The risks, benefits, expected outcome, and alternative to the recommended procedure have been discussed with the patient. Patient understands and wants to proceed with the procedure.     Electronically signed by Hernando Iqbal MD on 4/14/2025 at 7:47 AM    
[FreeTextEntry1] : Patient is a 75 yo man with Type 2 diabetes history of thalamic stroke, VT, HTN, HLD here for diabetes follow up\par \par The patient was diagnosed with diabetes around 2018 based on an elevated A1c. Had previously seen Dr. Byers in 2018. At the time, he was on metformin and repaglinide. October 2020, patient had an A1c of 8.5% which is high and was concerned.\par Current medications include metformin 1000 mg BID, repaglinide and trulicity\par Patient has a glucometer but does not check sugars, does not like the "ritual."\par Breakfast: cereal with almond milk and strawberries/blueberries; sometimes a bagel or half a bagel\par Lunch: eats out on occasion. \par Dinner: pizza last night; ordered last night as he came back from [place] and had delivery.  When in [place], he eats out, Italian foods and fish.\par No soda and no juice; tomato juice once in a while\par Patient has cut back on snacking, has not had ice cream regularly just over the holidays.  Occasional cookies. \par Dilated eye exam: history of cataracts; dilated eye exam August 2021, no reported diabetes related complications\par No history of pancreatitis; no personal/family history of MEN/MTC

## 2025-08-01 NOTE — PATIENT PROFILE ADULT - BRADEN MOISTURE
Goal Outcome Evaluation:  Plan of Care Reviewed With: patient        Progress: no change  Outcome Evaluation: Pt seen for PT/OT cotx this PM and tolerated the session fairly. Pt was seated EOB on arrival with urgent request for the bathroom. Per nsg pt was very unsteady this AM and required a BSC due to her balance. This session pt agreeable for bathroom attempt. Today, pt was CGA with time to stand from an elevated EOB to RW, Chris to ambulate 12' to the bathroom, ModA to stand from the low commode and Chris to ambulate additional 12' to the chair. Pt demos a slow, antalgic pace with soft knees at times d/t increase in pain. No overt LOB, buckling, dizziness or SOB, but was unsteady at times. Pt required CG/Chris for static standing w/ RW while OT assisting with pia-hygiene and LBD (see OT note). Pt declined further mobility or additional reps/exercises d/t to fatigue and pain. Discussed DC options as pt reports her daughter will be having foot/ankle surgery and will be unable to assist her at home. Explained with pts current assist level, endurance and balance, as she is a falls risk, that rehab would be her safer option since she would need to be independent at home. Discussed w/ RN and CCP. PT will cont to follow and rec SNF at DC.    Anticipated Discharge Disposition (PT): skilled nursing facility                         Referral received from Dr. Monroy for shortness of breath.   Referral and records reviewed by pulmonary MD, with recommendation for complete PFT's to be completed prior to consult visit.  Orders for testing placed.     (4) rarely moist